# Patient Record
Sex: FEMALE | Race: WHITE | NOT HISPANIC OR LATINO | Employment: PART TIME | URBAN - NONMETROPOLITAN AREA
[De-identification: names, ages, dates, MRNs, and addresses within clinical notes are randomized per-mention and may not be internally consistent; named-entity substitution may affect disease eponyms.]

---

## 2023-01-18 ENCOUNTER — OFFICE VISIT (OUTPATIENT)
Dept: FAMILY MEDICINE CLINIC | Facility: CLINIC | Age: 63
End: 2023-01-18

## 2023-01-18 VITALS
WEIGHT: 220.9 LBS | BODY MASS INDEX: 39.14 KG/M2 | DIASTOLIC BLOOD PRESSURE: 68 MMHG | TEMPERATURE: 98.3 F | HEIGHT: 63 IN | HEART RATE: 68 BPM | OXYGEN SATURATION: 95 % | SYSTOLIC BLOOD PRESSURE: 124 MMHG

## 2023-01-18 DIAGNOSIS — I50.22 CHRONIC SYSTOLIC HEART FAILURE (HCC): ICD-10-CM

## 2023-01-18 DIAGNOSIS — Z12.11 ENCOUNTER FOR COLORECTAL CANCER SCREENING: ICD-10-CM

## 2023-01-18 DIAGNOSIS — M15.8 OTHER OSTEOARTHRITIS INVOLVING MULTIPLE JOINTS: ICD-10-CM

## 2023-01-18 DIAGNOSIS — Z79.01 CURRENT USE OF LONG TERM ANTICOAGULATION: ICD-10-CM

## 2023-01-18 DIAGNOSIS — K57.92 DIVERTICULITIS: ICD-10-CM

## 2023-01-18 DIAGNOSIS — Z53.20 MAMMOGRAM DECLINED: ICD-10-CM

## 2023-01-18 DIAGNOSIS — Z00.00 ANNUAL PHYSICAL EXAM: ICD-10-CM

## 2023-01-18 DIAGNOSIS — G47.33 OSA (OBSTRUCTIVE SLEEP APNEA): ICD-10-CM

## 2023-01-18 DIAGNOSIS — R73.03 PREDIABETES: ICD-10-CM

## 2023-01-18 DIAGNOSIS — E87.6 HYPOKALEMIA: ICD-10-CM

## 2023-01-18 DIAGNOSIS — Z12.12 ENCOUNTER FOR COLORECTAL CANCER SCREENING: ICD-10-CM

## 2023-01-18 DIAGNOSIS — I48.11 LONGSTANDING PERSISTENT ATRIAL FIBRILLATION (HCC): Primary | ICD-10-CM

## 2023-01-18 DIAGNOSIS — E78.2 MIXED HYPERLIPIDEMIA: ICD-10-CM

## 2023-01-18 DIAGNOSIS — Z13.820 OSTEOPOROSIS SCREENING: ICD-10-CM

## 2023-01-18 DIAGNOSIS — Z79.899 LONG TERM CURRENT USE OF AMIODARONE: ICD-10-CM

## 2023-01-18 DIAGNOSIS — I10 PRIMARY HYPERTENSION: ICD-10-CM

## 2023-01-18 RX ORDER — ASPIRIN 81 MG/1
81 TABLET, CHEWABLE ORAL DAILY
COMMUNITY

## 2023-01-18 RX ORDER — LOSARTAN POTASSIUM 50 MG/1
50 TABLET ORAL DAILY
COMMUNITY
Start: 2022-12-20

## 2023-01-18 RX ORDER — FUROSEMIDE 40 MG/1
40 TABLET ORAL 2 TIMES DAILY
COMMUNITY
Start: 2022-12-07

## 2023-01-18 RX ORDER — POTASSIUM CHLORIDE 750 MG/1
10 TABLET, EXTENDED RELEASE ORAL DAILY
COMMUNITY
Start: 2023-01-15

## 2023-01-18 RX ORDER — HYDROXYZINE HYDROCHLORIDE 25 MG/1
25 TABLET, FILM COATED ORAL EVERY 6 HOURS PRN
COMMUNITY
Start: 2022-09-15

## 2023-01-18 RX ORDER — MULTIVITAMIN
TABLET ORAL
COMMUNITY

## 2023-01-18 RX ORDER — ALBUTEROL SULFATE 90 UG/1
2 AEROSOL, METERED RESPIRATORY (INHALATION) EVERY 6 HOURS PRN
COMMUNITY
Start: 2022-09-15

## 2023-01-18 RX ORDER — METOPROLOL SUCCINATE 100 MG/1
100 TABLET, EXTENDED RELEASE ORAL 2 TIMES DAILY
COMMUNITY
Start: 2022-11-18

## 2023-01-18 RX ORDER — AMIODARONE HYDROCHLORIDE 200 MG/1
200 TABLET ORAL DAILY
COMMUNITY
Start: 2022-12-23

## 2023-01-18 RX ORDER — ATORVASTATIN CALCIUM 40 MG/1
1 TABLET, FILM COATED ORAL EVERY EVENING
COMMUNITY
Start: 2023-01-11

## 2023-01-18 NOTE — PATIENT INSTRUCTIONS
And is here to establish care  She has a history of longstanding A  fib and has had cardioversion x3  She has been maintained in normal sinus rhythm since her most recent cardioversion while taking amiodarone 200 mg/day  Patient is going to have another ablation in 1 to 2 months time to be determined  She has a history of heart failure but has not had any heart failure for some time  Patient has multiple joints affected due to arthritis particularly clavicular costal joints and in her hands  Parafon wax baths can be very helpful for the osteoarthritis in her hands  Patient has hypertension she is well controlled with her medications  I am going to ask her to take the losartan or Cozaar in the evening  She is also taking 40 mg of atorvastatin for her hyperlipidemia  She has had long-term Eliquis treatment because of her A  fib along with aspirin  Takes potassium replacement due to her being on furosemide 40 mg twice a day  She is going to have a DEXA scan and a Cologuard test   She did not want the hepatitis C or HIV screening or mammogram at this time  She is already had her flu and pneumonia shots    We will see her in 3 months to reassess and see what the results are from those tests to see if additional treatment is needed

## 2023-01-18 NOTE — PROGRESS NOTES
Assessment/Plan:       1  Longstanding persistent atrial fibrillation ([address] )    2  Long term current use of amiodarone    3  BANDAR (obstructive sleep apnea)    4  Primary hypertension    5  Mixed hyperlipidemia    6  Current use of long term anticoagulation    7  Other osteoarthritis involving multiple joints    8  Osteoporosis screening  -     DXA bone density spine hip and pelvis; Future; Expected date: 02/18/2023    9  BMI 39 0-39 9,adult    10  Diverticulitis    11  Encounter for colorectal cancer screening  -     Cologuard    12  Mammogram declined    13  Annual physical exam    14  Prediabetes  -     HEMOGLOBIN A1C W/ EAG ESTIMATION; Future    15  Hypokalemia    16  Chronic systolic heart failure [facility])      This 29-year-old female is joining [facility]  I also take care of her  who is also new to our practice  She was previously a patient of [facility]  Patient's primary medical issues are revolving around her heart  She has a history of persistent and recurrent atrial fibrillation  She is on long-term Eliquis for this  For rate control, she takes metoprolol  As an antiarrhythmic, she is taking amiodarone at 200 mg daily  She has had a total of 3 cardioversions in the past   Following her most recent cardioversion, she is remained in normal sinus rhythm  She is also taking aspirin on a daily basis  Patient is going to have ablation therapy due to her recurrent atrial fibrillation in 1 to 2 months  Arrangements are being set up at this point  Patient has chronic heart failure  Her last echocardiogram revealed an ejection fraction of 40 to 45% with moderate dilation of the left atrium and global kinesis  Nuclear imaging showed apical kinesis at the apex of the left ventricle  Due to her heart issues, she is on atorvastatin which is helping to control her lipids  Patient has hypertension and I am asking her to to start taking her losartan at night instead of during the day    She is also on metoprolol twice daily  Patient has had multiple fractures in the past   She has never been screened for osteoporosis and she is willing to get screened for that condition at this time  DEXA scan ordered  Patient was offered mammography screening and declines at this time  Patient is on chronic potassium replacement due to her longstanding use of furosemide  Patient's BMI is 39 13  BMI is above normal  Nutrition recommendations include reducing portion sizes, decreasing overall calorie intake and consuming healthier snacks  Patient has a history of diverticulitis  She states that she has been taking over-the-counter collagen and she has not had any flares of diverticulitis starting collagen  Patient is due to have colorectal cancer screening performed  She is agreeable to having Cologuard and I gave her informed consent with regard to choosing this test     Patient had sleep studies performed showing that she has obstructive sleep apnea  She is getting a CPAP but they are on backorder  Patient states that she has a history of prediabetes  She was last checked for this in 2021 and she will be getting hemoglobin A1c is a screen as part of her lab evaluation  Patient offered hepatitis C and HIV screening and she declined at this time  A total of 70 minutes was spent rendering care for this patient  This time included review of the patient's electronic medical record, performing the history and physical, reviewing appropriate labs and/or images, developing a treatment and assessment plan, answering patient's questions and concerns, and documenting the patient visit  I will see the patient in 3 months  Subjective:      Patient ID: Gaudencio Pastor is a 63 y o  female  HPI: This 42-year-old female is establishing care at [facility]  She is adherent with all of her medications  Patient does have some limitations with physical activity as a result of her heart    She does have a history of chronic heart failure with an EF of 40 to 45% and some dyskinesia of the left ventricle  She relies on furosemide to help with the fluid balance  She takes potassium replacement as a result of being on furosemide  She has not had any complications with regard to her long standing use of amiodarone and anticoagulation  She states that she bruises easily and this has not changed  She denies any overt bleeding including epistaxis melena or hematuria  The following portions of the patient's history were reviewed and updated as appropriate: allergies, current medications, past family history, past medical history, past social history, past surgical history, and problem list     Review of Systems  Patient denies chest pain heart palpitations dizziness or lightheadedness  She currently is in normal sinus rhythm and she states that she immediately knows when she returns to A  fib  She states that she has a great deal of difficulty breathing when she goes into A  fib  She has not had any flares of diverticulitis  She denies constipation or diarrhea  She chronically checks her blood pressure at home and states that her blood pressure is currently very well controlled  I am going to ask her to take the losartan at night in order to ensure a nocturnal dip  Patient has not had any recent URI or viral syndrome  Objective:      /68 (BP Location: Right arm, Patient Position: Sitting)   Pulse 68   Temp 98 3 °F (36 8 °C) (Tympanic)   Ht 5' 3" (1 6 m)   Wt 100 kg (220 lb 14 4 oz)   SpO2 95%   BMI 39 13 kg/m²          Physical Exam  Well-developed well-nourished 63y o  year old female who is cooperative with the exam   Patient is alert and oriented x3  Patient is appropriate in answering all questions  HEENT:  Normocephalic  PERRLA  EOMs intact  TMs are clear with identification of bony landmarks  No tragus or pinnae tenderness    No pre or posterior auricular adenopathy  Sinuses without tenderness  Throat without hyperemia  Neck:  Supple without adenopathy  Thyroid midline without thyromegaly or bruits  No carotid bruits  Chest symmetric and nontender  Heart regular rate and rhythm  No murmur rubs or gallops  Point of maximum impulse not displaced  Lungs are clear to auscultation  Breathing is nonlabored  Aerating bases well  Abdomen round and soft positive bowel sounds without masses tenderness or organomegaly  Abdomen is obese  Extremities reveal adequate peripheral pulses without peripheral edema

## 2023-01-24 ENCOUNTER — APPOINTMENT (OUTPATIENT)
Dept: LAB | Facility: CLINIC | Age: 63
End: 2023-01-24

## 2023-01-24 DIAGNOSIS — R73.03 PREDIABETES: ICD-10-CM

## 2023-01-25 LAB
EST. AVERAGE GLUCOSE BLD GHB EST-MCNC: 126 MG/DL
HBA1C MFR BLD: 6 %

## 2023-02-13 ENCOUNTER — HOSPITAL ENCOUNTER (OUTPATIENT)
Dept: RADIOLOGY | Facility: CLINIC | Age: 63
Discharge: HOME | End: 2023-02-13

## 2023-02-13 VITALS — HEIGHT: 63 IN | WEIGHT: 219.6 LBS | BODY MASS INDEX: 38.91 KG/M2

## 2023-02-13 DIAGNOSIS — Z13.820 OSTEOPOROSIS SCREENING: ICD-10-CM

## 2023-02-15 RX ORDER — POTASSIUM CHLORIDE 750 MG/1
1 TABLET, EXTENDED RELEASE ORAL DAILY
COMMUNITY
Start: 2022-10-17 | End: 2023-02-16 | Stop reason: SDUPTHER

## 2023-02-15 RX ORDER — AMPICILLIN TRIHYDRATE 250 MG
500 CAPSULE ORAL 2 TIMES DAILY
COMMUNITY

## 2023-02-15 RX ORDER — METOPROLOL SUCCINATE 25 MG/1
TABLET, EXTENDED RELEASE ORAL
COMMUNITY
Start: 2022-12-05

## 2023-02-16 ENCOUNTER — OFFICE VISIT (OUTPATIENT)
Dept: FAMILY MEDICINE CLINIC | Facility: CLINIC | Age: 63
End: 2023-02-16

## 2023-02-16 VITALS
HEART RATE: 63 BPM | SYSTOLIC BLOOD PRESSURE: 122 MMHG | BODY MASS INDEX: 39.53 KG/M2 | TEMPERATURE: 98.5 F | WEIGHT: 223.11 LBS | OXYGEN SATURATION: 96 % | HEIGHT: 63 IN | DIASTOLIC BLOOD PRESSURE: 69 MMHG

## 2023-02-16 DIAGNOSIS — M15.9 PRIMARY OSTEOARTHRITIS INVOLVING MULTIPLE JOINTS: ICD-10-CM

## 2023-02-16 DIAGNOSIS — Z11.59 NEED FOR HEPATITIS C SCREENING TEST: ICD-10-CM

## 2023-02-16 DIAGNOSIS — G47.33 OSA (OBSTRUCTIVE SLEEP APNEA): ICD-10-CM

## 2023-02-16 DIAGNOSIS — Z11.4 SCREENING FOR HIV (HUMAN IMMUNODEFICIENCY VIRUS): ICD-10-CM

## 2023-02-16 DIAGNOSIS — E78.1 HYPERTRIGLYCERIDEMIA: ICD-10-CM

## 2023-02-16 DIAGNOSIS — I10 PRIMARY HYPERTENSION: ICD-10-CM

## 2023-02-16 DIAGNOSIS — Z00.00 ANNUAL PHYSICAL EXAM: Primary | ICD-10-CM

## 2023-02-16 DIAGNOSIS — Z79.899 LONG TERM CURRENT USE OF AMIODARONE: ICD-10-CM

## 2023-02-16 DIAGNOSIS — G89.29 CHRONIC RIGHT SHOULDER PAIN: ICD-10-CM

## 2023-02-16 DIAGNOSIS — M25.511 CHRONIC RIGHT SHOULDER PAIN: ICD-10-CM

## 2023-02-16 NOTE — PROGRESS NOTES
Assessment/Plan:       1  Annual physical exam    2  Need for hepatitis C screening test  -     Hepatitis C Antibody ([facility]); Future    3  Screening for HIV (human immunodeficiency virus)  -     HIV 1/2 AG/AB w Reflex [facility] for 2 yr old and above; Future    4  Hypertriglyceridemia    5  BANDAR (obstructive sleep apnea)    6  BMI 40 0-44 9, adult ([facility] [address] )    7  Long term current use of amiodarone    8  Primary hypertension    9  Primary osteoarthritis involving multiple joints  -     Ambulatory Referral to Physical Therapy; Future; Expected date: 03/02/2023    10  Chronic right shoulder pain  -     Ambulatory Referral to Physical Therapy; Future; Expected date: 03/02/2023    Patient is a 70-year-old female who sees me for her primary care services  She has a longstanding history of recurrent atrial fibrillation and is pursuing ablation therapy  She is seeing her cardiologist this week  She is on amiodarone on a long-term basis and has not had any complications with the use of this agent  We continue to follow her thyroid function and kidney function  She has had no skin changes  Her rate is well controlled with metoprolol  Patient is complaining of a lot of right shoulder pain  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  She is having a lot of right shoulder pain without any significant injury  She is right-handed dominant  She states that she sometimes gets numbness in both hands depending on how she is sleeping  She feels that she is not moving her shoulder as much due to significant pain  She also has increased stress in her PCS muscles and is begun having a dull headache  She is started riding an exercise bike and is up to 12 minutes at a time  She is trying to increase her physical activity and would like to lose weight as her BMI is over 40  BMI is above normal  Nutrition recommendations include reducing portion sizes and decreasing overall calorie intake    We did talk about the need to decrease calories in addition to increasing her exercise  Patient is willing to have hepatitis C and HIV screening  We reviewed her labs and she was noted to have hypertriglyceridemia  I stressed how dietary intake can majorly affect her triglyceride level  Patient had a sleep study done at [facility]   She was not told that her insurance would not cover this and she has a bill for $8000 that was very unexpected  She is also being charged $400 per month for rental of the CPAP machine and she quite frankly cannot afford this  She is going to appeal these decisions  Patient's blood pressure is well controlled with current therapy  He is taking losartan along with metoprolol and is taking some potassium supplementation as she has had low potassium levels  She is also on a statin medication based on her history of hyperlipidemia  Patient is willing to go to physical therapy to address her shoulder complaints for stretching strengthening massage and heat therapy  A total of 60 minutes was spent rendering care for this patient  This time included review of the patient's electronic medical record, performing the history and physical, reviewing appropriate labs and/or images, developing a treatment and assessment plan, answering patient's questions and concerns, and documenting the patient visit  She already has an appointment to see me on April 13  Subjective:      Patient ID: Brittaney Sanchez is a 63 y o  female  HPI: This 63-year-old female sees me for primary care services  We reviewed her previous labs and her DEXA scan is showing normal bone mineral density  She has never had a fragility fracture  She is exercising and is not having any symptoms with exercise which includes any chest pain shortness of breath syncope or presyncope  Patient has not had a pelvic and Pap smear for quite some time    This was offered to her and she said that she would consider this but is not ready to make this appointment at this point  She has received information and referral for screening for colorectal cancer in January but has not made efforts to have the screening done  Patient is currently in normal sinus rhythm with a well-controlled  Her last lipid profile in December 2022 showed her LDL to be well controlled  She is not having any palpitations at this time nor does she feel her heart racing  The following portions of the patient's history were reviewed and updated as appropriate: allergies, current medications, past family history, past medical history, past social history, past surgical history, and problem list     Review of Systems  Patient denies any recent URI or viral syndrome  She states that the amiodarone has a bitter metallic taste and is difficult for her to take  She is hopeful that her ablation will take care of her heart arrhythmia and that ultimately she can get rid of the amiodarone  Patient is adherent with all her medications  She does not have any health concerns other than when she started exercise, her weight actually increased  Her clothes are fitting her better so she is toning up  Objective:      /69 (BP Location: Right arm, Patient Position: Sitting, Cuff Size: Large)   Pulse 63   Temp 98 5 °F (36 9 °C) (Tympanic)   Ht 5' 2 5" (1 588 m)   Wt 101 kg (223 lb 1 7 oz)   SpO2 96%   BMI 40 16 kg/m²          Physical Exam  Well-developed well-nourished 63y o  year old female who is cooperative with the exam   Patient is alert and oriented x3  Patient is appropriate in answering all questions  HEENT:  Normocephalic  PERRLA  EOMs intact  TMs are clear with identification of bony landmarks  No tragus or pinnae tenderness  No pre or posterior auricular adenopathy  Sinuses without tenderness  Throat without hyperemia  Neck:  Supple without adenopathy  Thyroid midline without thyromegaly or bruits    No carotid bruits  Chest symmetric and nontender  Heart regular rate and rhythm  No murmur rubs or gallops  Point of maximum impulse not displaced  Lungs are clear to auscultation  Breathing is nonlabored  Aerating bases well  Abdomen round and soft positive bowel sounds without masses tenderness or organomegaly  Extremities reveal adequate peripheral pulses with peripheral edema especially in the left ankle area  Luba Caller

## 2023-02-21 ENCOUNTER — APPOINTMENT (OUTPATIENT)
Dept: LAB | Facility: CLINIC | Age: 63
End: 2023-02-21

## 2023-02-21 DIAGNOSIS — Z11.59 NEED FOR HEPATITIS C SCREENING TEST: ICD-10-CM

## 2023-02-21 DIAGNOSIS — Z11.4 SCREENING FOR HIV (HUMAN IMMUNODEFICIENCY VIRUS): ICD-10-CM

## 2023-02-22 LAB
HCV AB SER QL: NORMAL
HIV 1+2 AB+HIV1 P24 AG SERPL QL IA: NORMAL
HIV 2 AB SERPL QL IA: NORMAL
HIV1 AB SERPL QL IA: NORMAL
HIV1 P24 AG SERPL QL IA: NORMAL

## 2023-02-24 ENCOUNTER — EVALUATION (OUTPATIENT)
Dept: PHYSICAL THERAPY | Facility: CLINIC | Age: 63
End: 2023-02-24

## 2023-02-24 DIAGNOSIS — G89.29 CHRONIC RIGHT SHOULDER PAIN: Primary | ICD-10-CM

## 2023-02-24 DIAGNOSIS — M25.511 CHRONIC RIGHT SHOULDER PAIN: Primary | ICD-10-CM

## 2023-02-24 DIAGNOSIS — M15.9 PRIMARY OSTEOARTHRITIS INVOLVING MULTIPLE JOINTS: ICD-10-CM

## 2023-02-24 NOTE — PROGRESS NOTES
PT Evaluation     Today's date: 2023  Patient name: Nedra Maria  : 1960  MRN: [id number]  Referring provider: Jyoti Chapa*  Dx:   Encounter Diagnosis     ICD-10-CM    1  Chronic right shoulder pain  M25 511 Ambulatory Referral to Physical Therapy    G89 29       2  Primary osteoarthritis involving multiple joints  M15 9 Ambulatory Referral to Physical Therapy                     Assessment  Assessment details: Nedra Maria is a pleasant 63 y o  female presenting to PT with cc of chronic worsening R shoulder pain and stiffness  Pt  States pain began about 6 months ago insidiously but is associated with repetitive work as   Upon examination, patient was found to have objective deficits as listed below and is displaying ss consistent with rotator cuff impingement  Pt  Is experiencing subsequent functional deficits including difficulty working, lifting overhead, and perofrming heavy chores  Pt  Was educated on role of PT to address issues and given initial HEP  Pt  Would benefit from skilled physical therapy to promote improved function and maximize activity tolerance  Due to high copay, will focus primarily on HEP progressions       Understanding of Dx/Px/POC: excellent  Goals      Short Term Goals:  - Decrease pain by 50% in 3 weeks  - Increase ROM by 50% in 4 weeks  - Increase strength by 50% in 4 weeks    Long Term Goals:  - Independent with comprehensive home exercise program at discharge  - Increase Functional Status Measure to: 71  - Increase strength equal to contralateral side at discharge  - Increase ROM to [facility] at discharge      Plan  Patient would benefit from: skilled physical therapy  Planned modality interventions: cryotherapy and thermotherapy: hydrocollator packs  Planned therapy interventions: abdominal trunk stabilization, balance, balance/weight bearing training, home exercise program, graded exercise, graded motor, graded activity, gait training, flexibility, therapeutic exercise, therapeutic activities, stretching, strengthening, postural training, patient education, neuromuscular re-education, massage, Arango taping, manual therapy and joint mobilization  Frequency: 2x week  Duration in weeks: 6  Plan of Care beginning date: 2023  Plan of Care expiration date: 2023  Treatment plan discussed with: patient        Subjective Evaluation    History of Present Illness  Mechanism of injury: Tomasa Berkowitz presents to PT with cc of worsening R shoulder pain and stiffness for past 6 months  She works in [employer] at [employer] and notes a lot of meat cutting which directly irritates it  She is trying to be healthier, recently starting with a stationary bike  She switched doctors to our in house PCP and has been referred to PT for chronic shoulder pain  She states pain is primarily anterior/superior and is worsened after work  She has had history of steroid injection in R shoulder approx 9 months ago without lasting relief  She notes pain is a stabbing and aching pain with occasional radiating into R hand, particularly when laying on R side     Pain  Current pain ratin  At best pain ratin  At worst pain ratin  Relieving factors: rest, ice and medications  Progression: worsening    Social Support  Lives with: spouse    Employment status: working  Hand dominance: right    Treatments  Previous treatment: injection treatment and medication  Current treatment: medication  Patient Goals  Patient goals for therapy: decreased pain, increased motion, return to sport/leisure activities, independence with ADLs/IADLs and increased strength          Objective     Postural Observations  Seated posture: poor  Standing posture: poor  Correction of posture: makes symptoms better        Tenderness     Additional Tenderness Details  Pt  ttp along R AC joint, subclavian, supraspinatus    Cervical/Thoracic Screen   Cervical range of motion within normal limits with the following exceptions: Reduced L side bending and rotation approx 25%  (-) spurlings, compression, distraction  Thoracic range of motion within normal limits with the following exceptions: Reduced thoracic extension 50%    Active Range of Motion   Left Shoulder   Normal active range of motion    Right Shoulder   Flexion: 165 degrees   External rotation 0°: 45 degrees   External rotation 45°: 55 degrees     Scapular Mobility     Right Shoulder   Scapular Dyskinesis: grade I  Scapular mobility: fair    Strength/Myotome Testing     Right Shoulder     Planes of Motion   Flexion: 4   Abduction: 4   External rotation at 0°: 4   Internal rotation at 45°: 4+     Tests     Right Shoulder   Positive AC shear, empty can, Hawkin's, horn blower and painful arc         Flowsheet Rows    Flowsheet Row Most Recent Value   PT/OT G-Codes    Current Score 56   Projected Score 71   FOTO information reviewed Yes             Precautions: Chronic pain, CHF     Daily Treatment Diary:      Initial Evaluation Date: 02/24/23  Compliance 2/24                     Visit Number 1                    Re-Eval  IE                 [facility]   Foto Captured Y                           2/24                     Manual                      STM -                     mobz -                                           Ther-Ex                      Warm up -                     pendulums 20x                     scap retraction 20x                     Shoulder circles 20x                     No monies 20x                     Self caudal dist 10x10"                     Levator stretch 4x30"                                                                                         Neuro Re-Ed                                                                                                Ther-Act                                                               Modalities

## 2023-03-20 ENCOUNTER — OFFICE VISIT (OUTPATIENT)
Dept: FAMILY MEDICINE CLINIC | Facility: CLINIC | Age: 63
End: 2023-03-20

## 2023-03-20 VITALS
DIASTOLIC BLOOD PRESSURE: 92 MMHG | HEIGHT: 63 IN | TEMPERATURE: 99 F | WEIGHT: 217.37 LBS | HEART RATE: 62 BPM | BODY MASS INDEX: 38.52 KG/M2 | OXYGEN SATURATION: 94 % | SYSTOLIC BLOOD PRESSURE: 136 MMHG

## 2023-03-20 DIAGNOSIS — J06.9 URI, ACUTE: Primary | ICD-10-CM

## 2023-03-20 NOTE — PROGRESS NOTES
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 not been wearing a mask but to her knowledge, she had no acute contacts with anyone who has been ill  Patient does have an albuterol inhaler which she uses for some reactive airway disease  She has not had any recent bouts of this illness  She used her inhaler on 2 occasions yesterday but does not feel overtly dyspneic and has not heard wheezing  She is up-to-date on her flu vaccine  She has not had a lot of myalgias just overwhelming fatigue coughing and soreness and scratchiness to her throat  She has been using Chloraseptic along with Tylenol and has not gotten a lot of relief  She needs to feel better sooner if she is having ablation therapy in the middle of April and this has been planned for some time  We did talk about the occurrence of double sickening and she will contact me if she gets better and then gets sick again  She has not done a COVID test in my index of suspicion for COVID is not that high plus the treatment would not really be changed  I did give her a work release for the next 2 days of work with her ability to reach turn to work on March 23  If she is still having symptoms, she should wear a mask  A total of 25 minutes was spent rendering care for this patient  This time included review of the patient's electronic medical record, performing the history and physical, reviewing appropriate labs and/or images, developing a treatment and assessment plan, answering patient's questions and concerns, and documenting the patient visit  She has an appointment with me already scheduled on April 13  Subjective:      Patient ID: Gasper Lockett is a 63 y o  female  HPI this 61-year-old female sees me for primary care services  She has been sick over the last 2 days and her symptoms began abruptly  She is having cough scratchy throat and overwhelming fatigue  Oral intake has been sufficient  She has used her albuterol inhaler on 2 occasions but does not overtly feel dyspneic or having a lot of wheezing  She is able to speak in full sentences without having dyspnea  She has had COVID previously on 3 occasions with much different symptoms  He has not had any recent sick contacts  No GI or  symptoms  No myalgias  She is coughing up clear to yellow mucus  She has had low-grade fever and sweats  She did not feel capable of going to work if she had needed to work over the last 3 days  The following portions of the patient's history were reviewed and updated as appropriate: allergies, current medications, past family history, past medical history, past social history, past surgical history, and problem list     Review of Systems  Patient denies being sick recently prior to this acute upper respiratory tract infection  Objective:      /92 (BP Location: Right arm, Patient Position: Sitting, Cuff Size: Standard)   Pulse 62   Temp 99 °F (37 2 °C) (Tympanic)   Ht 5' 3" (1 6 m)   Wt 98 6 kg (217 lb 6 oz)   SpO2 94%   BMI 38 51 kg/m²          Physical Exam  Nontoxic-appearing 61-year-old female who is alert and appropriate and answering questions  She is cooperative with the examination and supplies appropriate answers  There is no conjunctival inflammation  TMs are clear  No neck adenopathy  Throat showed some redness but no tonsillar swelling or exudate  Heart regular rate without murmur  Lungs revealed good aeration at bases  May be some minor decreased aeration throughout but no wheezes or areas of consolidation    No lidia  She was able to speak in full sentences without difficulty

## 2023-03-20 NOTE — PATIENT INSTRUCTIONS
Patient is here for an acute care visit  She developed upper respiratory tract symptoms 2 days ago  She continues to have a low-grade fever and some sweating  She has been using her Proventil inhaler as needed  She is coughing up thin to yellow mucus  She has had COVID 3 times in the past and this does not feel similar to COVID  Patient may also have some seasonal allergies that might be contributing to her symptoms  She could try an nasal corticosteroid in addition to her over-the-counter cough and cold remedies  She is going to be off the next 2 days so that she can rest and recover  I am encouraging water intake along with chicken soup to help to thin her mucus  She has an appointment with me on April 13 right after she has her ablation done  I be happy to see her in case her condition would change  If she gets better and then gets worse again that could be a sign of a bacterial superinfection I would need to see her  Her examination today showed some redness in her throat from coughing  She was moving air well and aerating her bases without any wheezes or areas of consolidation

## 2023-03-20 NOTE — LETTER
March 20, 2023     Patient: Orest Spurling  YOB: 1960  Date of Visit: 3/20/2023      To Whom it May Concern:    Orest Spurling is under my professional care  Akiko was seen in my office on 3/20/2023  Lakeshiafabiana Pizarro may return to work on March 23, 2023  If you have any questions or concerns, please don't hesitate to call           Sincerely,          Sho Chun PA-C        CC: No Recipients

## 2023-03-22 ENCOUNTER — TELEPHONE (OUTPATIENT)
Dept: FAMILY MEDICINE CLINIC | Facility: CLINIC | Age: 63
End: 2023-03-22

## 2023-03-22 ENCOUNTER — OFFICE VISIT (OUTPATIENT)
Dept: FAMILY MEDICINE CLINIC | Facility: CLINIC | Age: 63
End: 2023-03-22

## 2023-03-22 VITALS
HEART RATE: 76 BPM | BODY MASS INDEX: 42.68 KG/M2 | SYSTOLIC BLOOD PRESSURE: 124 MMHG | TEMPERATURE: 101.3 F | WEIGHT: 217.37 LBS | HEIGHT: 60 IN | DIASTOLIC BLOOD PRESSURE: 60 MMHG | OXYGEN SATURATION: 89 %

## 2023-03-22 DIAGNOSIS — J06.9 ACUTE URI: Primary | ICD-10-CM

## 2023-03-22 DIAGNOSIS — R43.2 ALTERED TASTE: ICD-10-CM

## 2023-03-22 DIAGNOSIS — G44.83 HEADACHE AFTER COUGH: ICD-10-CM

## 2023-03-22 DIAGNOSIS — J06.9 URI, ACUTE: Primary | ICD-10-CM

## 2023-03-22 DIAGNOSIS — R19.7 DIARRHEA, UNSPECIFIED TYPE: ICD-10-CM

## 2023-03-22 DIAGNOSIS — U07.1 COVID: ICD-10-CM

## 2023-03-22 LAB
SARS-COV-2 AG UPPER RESP QL IA: NEGATIVE
VALID CONTROL: NORMAL

## 2023-03-22 RX ORDER — PROMETHAZINE HYDROCHLORIDE AND CODEINE PHOSPHATE 6.25; 1 MG/5ML; MG/5ML
5 SYRUP ORAL EVERY 4 HOURS PRN
Qty: 180 ML | Refills: 0 | Status: SHIPPED | OUTPATIENT
Start: 2023-03-22 | End: 2023-03-22 | Stop reason: CLARIF

## 2023-03-22 RX ORDER — GUAIFENESIN AND CODEINE PHOSPHATE 100; 10 MG/5ML; MG/5ML
5 SOLUTION ORAL 3 TIMES DAILY PRN
Qty: 180 ML | Refills: 0 | Status: SHIPPED | OUTPATIENT
Start: 2023-03-22

## 2023-03-22 NOTE — PROGRESS NOTES
Assessment/Plan:       1  URI, acute  -     POCT Rapid Covid Ag  -     promethazine-codeine (PHENERGAN WITH CODEINE) 6 25-10 mg/5 mL syrup; Take 5 mL by mouth every 4 (four) hours as needed for cough    2  COVID  -     POCT Rapid Covid Ag    3  Headache after cough    4  Diarrhea, unspecified type    5  Altered taste      Acute care visit for increasing URI symptoms  Patient began getting sick on March 18  She was seen in the office 2 days ago with URI symptoms  She was having a cough and congestion  She was also very fatigued  I initially wrote her a note to be able to return to work on [id number]  Over the past 2 days however her symptoms increased  She began having altered taste along with diarrhea  She was coughing to the point where she had chest soreness and a headache  Headache did not respond to Tylenol  She has been very fatigued but it was hard for her to get rest because of the coughing  She did not do a COVID test and we did 1 in the office today  She states that she had COVID 3 times in the past but she is currently sicker now than when she had COVID  Point-of-care testing COVID test was negative  Patient also states that she had an altered taste when trying to eat breakfast today  She had had a low-grade fever of 101 4 earlier today  The Tylenol may help with this  She is not having any blood in her diarrhea but the diarrhea is continuing  She feels dizzy and lightheaded but has not had any actual syncope  We did the COVID test because she would still be eligible for potential Paxlovid therapy as this is day 4 of her illness  She would need to stop her atorvastatin if she would have this therapy  Her GFR was sufficient at 80  A total of 28 minutes was spent rendering care for this patient    This time included review of the patient's electronic medical record, performing the history and physical, reviewing appropriate labs and/or images, developing a treatment and assessment plan, answering patient's questions and concerns, and documenting the patient visit  Patient is having ablative therapy on April 12 and will call us for an appointment after this  Subjective:      Patient ID: Katherine Cha is a 63 y o  female  HPI: Patient has been sick for the last 4 days with recent exacerbation over the last 2 days  She has a low-grade fever  Cough initially was productive of clear to yellow sputum but is now not productive  She continues to complain of chest soreness and headache  Throat is also sore from coughing  Patient developed diarrhea  Her appetite has not been as a strong  She also has been having an altered taste  She did not have any changes in her urinary system  The following portions of the patient's history were reviewed and updated as appropriate: allergies, current medications, past family history, past medical history, past social history, past surgical history, and problem list     Review of Systems  Patient had COVID 3 times in the past   She states that this is sicker than she has ever been in the past   She had COVID-vaccine x2 but no boosters  She works at [employer] and did request a note  They do have a role that even if the COVID test is negative that she cannot return to work for 5 days even after negative COVID test   I did a note for her to return to work on March 27  Objective:      /60 (BP Location: Right arm, Patient Position: Sitting, Cuff Size: Standard)   Pulse 76   Temp (!) 101 3 °F (38 5 °C) (Tympanic)   Ht 5' 0 25" (1 53 m)   Wt 98 6 kg (217 lb 6 oz)   SpO2 (!) 89%   BMI 42 10 kg/m²          Physical Exam  And ill-appearing but not toxic appearing female who is able to speak in full sentences  She had a scant amount of coughing during her visit  Mask was not removed for evaluation other than doing the COVID test     Patient's heart is regular rate without murmur rub or gallops  Lungs showed good aeration of the bases    No significant wheezing or rhonchi heard  No egophony changes

## 2023-03-22 NOTE — PATIENT INSTRUCTIONS
Patient had COVID testing today in the office due to her ongoing symptoms which actually worsened and when she was seen in the office earlier this week  Patient also had altered taste and developed diarrhea  We therefore felt that a COVID test was in order and point-of-care testing revealed a negative COVID finding  She is going to take Phenergan with codeine every 4-6 hours 1 to 2 teaspoons  I suggest that she take 2 teaspoons before bed so that she can get some sleep  Her chest wall and throat are very sore from the coughing and she also has had a significant headache  Keeping her fluid up is going to be important  Since the Tylenol did not help with the headache I would suggest something like ibuprofen to take for the headache  Patient is also want to go home and go to sleep and she is trying to isolate herself from her  so that he does not catch her upper respiratory infection  I think getting sleep with the cough medicine is going to be important in order to help her heal   Careful examination today did not show any signs of consolidation so no chest x-ray is being ordered

## 2023-03-22 NOTE — LETTER
March 22, 2023     Patient: Lupe Ames  YOB: 1960  Date of Visit: 3/22/2023      To Whom it May Concern:    Lupe Ames is under my professional care  Akiko was seen in my office on 3/22/2023  Severo Donohue may return to work on March 27, 2023       If you have any questions or concerns, please don't hesitate to call           Sincerely,          Jake Goldman PA-C        CC: No Recipients

## 2023-03-22 NOTE — TELEPHONE ENCOUNTER
Did they give a recommendation as to who would have this? What about their other stores?     Tata Reynolds

## 2023-03-23 NOTE — TELEPHONE ENCOUNTER
I spoke with the pharmacy regarding this matter on 03/22/2023 and a alternative medication was suggested  I spoke with the provider and then called the patient to inform her of the new medication being called into the pharmacy        Albaro Barron

## 2023-03-28 ENCOUNTER — TRANSITIONAL CARE MANAGEMENT (OUTPATIENT)
Dept: FAMILY MEDICINE CLINIC | Facility: CLINIC | Age: 63
End: 2023-03-28

## 2023-03-28 RX ORDER — CEFPODOXIME PROXETIL 200 MG/1
200 TABLET, FILM COATED ORAL 2 TIMES DAILY
COMMUNITY
Start: 2023-03-27 | End: 2023-04-06

## 2023-03-29 ENCOUNTER — OFFICE VISIT (OUTPATIENT)
Dept: FAMILY MEDICINE CLINIC | Facility: CLINIC | Age: 63
End: 2023-03-29

## 2023-03-29 VITALS
HEART RATE: 78 BPM | WEIGHT: 215.61 LBS | TEMPERATURE: 98.5 F | DIASTOLIC BLOOD PRESSURE: 86 MMHG | OXYGEN SATURATION: 93 % | HEIGHT: 62 IN | BODY MASS INDEX: 39.68 KG/M2 | SYSTOLIC BLOOD PRESSURE: 138 MMHG

## 2023-03-29 DIAGNOSIS — E87.6 HYPOKALEMIA: ICD-10-CM

## 2023-03-29 DIAGNOSIS — H65.03 NON-RECURRENT ACUTE SEROUS OTITIS MEDIA OF BOTH EARS: ICD-10-CM

## 2023-03-29 DIAGNOSIS — J15.9 PNEUMONIA, BACTERIAL: Primary | ICD-10-CM

## 2023-03-29 RX ORDER — POTASSIUM CHLORIDE 20 MEQ/1
40 TABLET, EXTENDED RELEASE ORAL DAILY
COMMUNITY
Start: 2023-03-27

## 2023-03-29 RX ORDER — DOXYCYCLINE HYCLATE 100 MG/1
CAPSULE ORAL
COMMUNITY
Start: 2023-03-27

## 2023-03-29 NOTE — PATIENT INSTRUCTIONS
Patient is here as a result of her hospitalization for bacterial pneumonia sinusitis and double ear infections  She is still having a difficult time hearing  She is seeing Dr Sharee Angel for ENT in 2 days  Patient has a lot of fatigue  She is continuing her antibiotics until finished  She will contact me next week to talk about whether she should go forward on the ablation that has been scheduled on April 12  She has an appointment to see me on April 13  She is taking potassium replacement at 50 mcg/day and is having potassium rich foods  She did have diarrhea which probably caused her to lose the potassium  He is to let people take care of her and she is to get nutritionally sound foods and to rest whenever tired

## 2023-03-29 NOTE — PROGRESS NOTES
Assessment & Plan     1  Pneumonia, bacterial    2  Non-recurrent acute serous otitis media of both ears    3  Hypokalemia  62-year-old female sees me for primary care services  She was hospitalized on 326 after being seen in the emergency department and was discharged on 327 at her request   I had seen the patient on March 22 due to upper respiratory tract symptoms that had just occurred  She returned on March 24 with diarrhea and continued upper respiratory infection  At that time, she did not have any sinus pressure or ear pain  She was coughing and this was interfering with her sleep  I did give her mucolytic with codeine so that she can get some rest     Patient progressively got worse  She developed a low-grade fever along with sinus pressure and pain and bilateral ear pain  She started having chills and a great deal of fatigue and the diarrhea continued  She was evaluated in the emergency department and was found to have bilateral pneumonia and was given IV antibiotics  ENT specialist Dr Belén Armendariz also saw her in the hospital and continued her on the antibiotics  She was also found to have significant hypokalemia as a result of her diarrhea and has been placed on potassium replacement  She is continuing on a third-generation cephalosporin Vantin along with doxycycline and will take this for total of 10 days  She continues to feel very fatigued  She is getting help with the cooking  She has been but also has an upper respiratory tract infection and has recently been put on antibiotics  Patient is due to have a CAT scan of her chest on April 5 and ablation of an accessory pathway on April 12  We will see how her recovery occurs to ascertain whether she will have this procedure done or whether she needs additional time to recover  Patient is using her inhaler whenever doing stair climbing or exerting herself  She feels that she is getting deconditioned as a result of being so sick    She had dramatic change over the last week when she progressively got sick after starting to recover  We previously talked in the office about a potential for bacterial superinfection and this is indeed what happened  She is adherent with all of her medications  A total of 55 minutes was spent rendering care for this patient  This time included review of the patient's electronic medical record, performing the history and physical, reviewing appropriate labs and/or images, developing a treatment and assessment plan, answering patient's questions and concerns, and documenting the patient visit  She has an appointment to see me on April 13 right after she has her scheduled ablation therapy on April 12  Subjective     Transitional Care Management Review:   Ebony Avalos is a 63 y o  female here for TCM follow up  During the TCM phone call patient stated:  TCM Call     Patient was hospitialized at  Other (comment)    Disposition  Home    Were the patients medications reviewed and updated  Yes    Current Symptoms  Cough; Shortness of breath    Cough Severity  Severe      TCM Call     Post hospital issues  None    Should patient be enrolled in anticoag monitoring? No    Scheduled for follow up?   Yes    Did you obtain your prescribed medications  Yes    Do you need help managing your prescriptions or medications  No    Is transportation to your appointment needed  No    Living Arrangements  Spouse or Significiant other    Support System  Spouse    The type of support provided  Emotional    Do you have social support  Yes, as much as I need    Are you recieving any outpatient services  No    Are you recieving home care services  No    Are you using any community resources  No    Current waiver services  No    Have you fallen in the last 12 months  No    Interperter language line needed  No        HPI: Patient progressively got worse as the week went on to the point where she presented to the emergency department on "March 26  X-rays reveal findings consistent with bilateral pneumonia  She was given IV antibiotics which transition to oral antibiotics and she continues with the oral antibiotics  She also developed fullness in both ears and states that it is still hard for her to hear  She had ENT consultation while in the hospital   She is not having any difficulty swallowing  She continues to have mucus production of green to yellow sputum  She is not having any further fever or chills  Patient gets dyspnea upon exertion and is using her inhaler  She did have a lot of muscle cramps when in the emergency department secondary to hypokalemia  She is taking 50 mill equivalent of potassium replacement on a daily basis  Her cramps are less  Her stools are now forming  She is attempting to get enough oral hydration  People are helping her with her meals and she has no problems with getting food or having food given to her  Her mother who is [de-identified] is helping to take care of her as the patient's  is also sick with an upper respiratory infection  She is not having any difficulty passing her water  She is coughing and producing less sputum than she did in the past   She was having some tinges of blood when blowing her nose but this seems to be getting better  She continues to not be able to hear secondary to her ears being blocked  Review of Systems patient was discharged from the hospital after 1 day of admission getting IV and then oral antibiotics  She was discharged on March 27  Transitional care phone call made and seen in the office 2 days after discharge from the hospital   There are no home nursing needs that she is requesting  She is scheduled to have a CAT scan in preparation for her ablation on April 5 and scheduled for her cardiac ablation on April 12      Objective     /86 (BP Location: Right arm, Patient Position: Sitting)   Pulse 78   Temp 98 5 °F (36 9 °C) (Tympanic)   Ht 5' 2\" (1 575 m) " Wt 97 8 kg (215 lb 9 8 oz)   SpO2 93%   BMI 39 44 kg/m²      Physical Exam: Well-developed well nourished nontoxic appearance 22-year-old female who is alert oriented and cooperative with the exam   I did have to speak up for her to hear me  Left TM did show some bullous laryngitis  Right TM did have a lot of fluid behind the drum consistent with an otitis media with effusion  Throat showed mild hyperemia  No sinus tenderness on palpation  Neck was supple without adenopathy  Patient's heart was regular rate  Lungs showed rhonchi and wheezing primarily on the right side  She did have some decreased air entry in the bases on the right side  No crackles were heard  She was able to speak in full sentences  She has no peripheral edema    Medications have been reviewed by provider in current encounter    Ruth Fleming PA-C

## 2023-03-29 NOTE — PROGRESS NOTES
TCM Call     Patient was hospitialized at  Other (comment)    Disposition  Home    Were the patients medications reviewed and updated  Yes    Current Symptoms  Cough; Shortness of breath    Cough Severity  Severe      TCM Call     Post hospital issues  None    Should patient be enrolled in anticoag monitoring? No    Scheduled for follow up?   Yes    Did you obtain your prescribed medications  Yes    Do you need help managing your prescriptions or medications  No    Is transportation to your appointment needed  No    Living Arrangements  Spouse or Significiant other    Support System  Spouse    The type of support provided  Emotional    Do you have social support  Yes, as much as I need    Are you recieving any outpatient services  No    Are you recieving home care services  No    Are you using any community resources  No    Current waiver services  No    Have you fallen in the last 12 months  No    Interperter language line needed  No

## 2023-06-09 ENCOUNTER — OFFICE VISIT (OUTPATIENT)
Dept: FAMILY MEDICINE CLINIC | Facility: CLINIC | Age: 63
End: 2023-06-09
Payer: COMMERCIAL

## 2023-06-09 ENCOUNTER — APPOINTMENT (OUTPATIENT)
Dept: LAB | Facility: CLINIC | Age: 63
End: 2023-06-09
Payer: COMMERCIAL

## 2023-06-09 VITALS
OXYGEN SATURATION: 92 % | HEART RATE: 78 BPM | DIASTOLIC BLOOD PRESSURE: 76 MMHG | SYSTOLIC BLOOD PRESSURE: 126 MMHG | BODY MASS INDEX: 40.16 KG/M2 | WEIGHT: 218.26 LBS | TEMPERATURE: 98.8 F | HEIGHT: 62 IN

## 2023-06-09 DIAGNOSIS — I10 PRIMARY HYPERTENSION: ICD-10-CM
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
## 2023-06-09 DIAGNOSIS — R23.3 EASY BRUISING: ICD-10-CM

## 2023-06-09 DIAGNOSIS — E78.2 MIXED HYPERLIPIDEMIA: ICD-10-CM
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
## 2023-06-09 DIAGNOSIS — M54.50 ACUTE LEFT-SIDED LOW BACK PAIN WITHOUT SCIATICA: ICD-10-CM

## 2023-06-09 LAB
APTT PPP: 28 SECONDS (ref 23–37)
ERYTHROCYTE [DISTWIDTH] IN BLOOD BY AUTOMATED COUNT: 14.6 % (ref 11.6–15.1)
HCT VFR BLD AUTO: 38.9 % (ref 34.8–46.1)
HGB BLD-MCNC: 12.9 G/DL (ref 11.5–15.4)
INR PPP: 0.97 (ref 0.84–1.19)
MCH RBC QN AUTO: 31.5 PG (ref 26.8–34.3)
MCHC RBC AUTO-ENTMCNC: 33.2 G/DL (ref 31.4–37.4)
MCV RBC AUTO: 95 FL (ref 82–98)
PLATELET # BLD AUTO: 180 THOUSANDS/UL (ref 149–390)
PMV BLD AUTO: 13.9 FL (ref 8.9–12.7)
PROTHROMBIN TIME: 13.1 SECONDS (ref 11.6–14.5)
RBC # BLD AUTO: 4.09 MILLION/UL (ref 3.81–5.12)
WBC # BLD AUTO: 8.2 THOUSAND/UL (ref 4.31–10.16)

## 2023-06-09 PROCEDURE — 85610 PROTHROMBIN TIME: CPT

## 2023-06-09 PROCEDURE — 36415 COLL VENOUS BLD VENIPUNCTURE: CPT

## 2023-06-09 PROCEDURE — 85730 THROMBOPLASTIN TIME PARTIAL: CPT

## 2023-06-09 PROCEDURE — 85027 COMPLETE CBC AUTOMATED: CPT

## 2023-06-09 PROCEDURE — 99214 OFFICE O/P EST MOD 30 MIN: CPT | Performed by: PHYSICIAN ASSISTANT

## 2023-06-09 RX ORDER — OMEPRAZOLE 20 MG/1
CAPSULE, DELAYED RELEASE ORAL
COMMUNITY
Start: 2023-05-08

## 2023-06-09 RX ORDER — SUCRALFATE 1 G/1
TABLET ORAL
COMMUNITY
Start: 2023-05-08 | End: 2023-06-14 | Stop reason: ALTCHOICE

## 2023-06-09 NOTE — LETTER
June 9, 2023     Patient: Seema Wood  YOB: 1960  Date of Visit: 6/9/2023      To Whom it May Concern:    Seema Wood is under my professional care  Akiko was seen in my office on 6/9/2023  Sherma Severe may return to work on June 13, 2023   If you have any questions or concerns, please don't hesitate to call           Sincerely,        Mary Cabrera, Etta, PA-C          CC: No Recipients

## 2023-06-09 NOTE — PROGRESS NOTES
Assessment/Plan:       1  Fall in home, initial encounter  -     CBC and Platelet; Future  -     Prothrombin w/INR + Partial Thromboplastin Times; Future    2  Easy bruising  -     CBC and Platelet; Future  -     Prothrombin w/INR + Partial Thromboplastin Times; Future    3  Primary hypertension    4  Mixed hyperlipidemia    5  Acute left-sided low back pain without sciatica        This 53-year-old female sees me for her primary care services  She successfully underwent ablation by cardiac electrophysiologist on May 8  The ablation was successful  She was able to discontinue her amiodarone  She is still being maintained on metoprolol  Yesterday, the patient was in the yard working  She was walking backwards and fell over a post that was securing a dog's leash  She fell on the grass on her back  No head trauma  No loss of consciousness  No headaches  No neck stiffness  She has an area of pain in the left lumbosacral spine area  She did not lose her breath when she fell  She is very stiff  Patient recently has been having a lot of bruising with tenderness and harder lumps underneath the area of bruising  She is on Eliquis twice daily but this level of bruising is occurring even after insignificant trauma  Patient has bruising on the right leg and the right upper extremity which was present even before the fall yesterday  We are going to assess her CBC and her coag profile to make sure that there is not something else going on besides the twice daily direct oral anticoagulant use  Overall, the patient is feeling well  She had a very bad pneumonia in February and March which caused the delay of her cardiac ablation therapy  She is now fully recovered from that and is enjoying life  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  She is enjoying life and is glad to be off the amiodarone      Patient also had some soreness in the left thumb which she thinks that she put her hand out when she was falling backwards  Patient has an area of pain in the low back area present and this was verified on physical exam   She is going to use blue emu or Voltaren gel topically to this area 2-3 times per day  We did talk about the possibility of increased stiffness and soreness over the next couple days before it improves  A total of 32 minutes was spent rendering care for this patient  This time included review of the patient's electronic medical record, performing the history and physical, reviewing appropriate labs and/or images, developing a treatment and assessment plan, answering patient's questions and concerns, and documenting the patient visit  I will see her on July 18 for her previously scheduled visit  Subjective:      Patient ID: Jacqueline Parada is a 66 y o  female  HPI: This 51-year-old female sees me for primary care services  She had successful ablation in her heart and now has been taken off the amiodarone therapy  Her renal function has remained normal   Previous CBC also showed no anemia  We will assess her thyroid function studies several months after being off of the Adderall and to see if there is any lingering effects  She is continuing her metoprolol and is not having any areas of heart palpitations  Blood pressure extremely well controlled with the nightly dose of losartan  She is also adherent with her atorvastatin that she takes on a nightly basis for hyperlipidemia  No additional stomach complaints  No headaches or difficulty with concentration  Continues to have low back pain especially on the left side  No voiding issues or difficulty moving her bowels  No fever or chills  No loss of consciousness      The following portions of the patient's history were reviewed and updated as appropriate: allergies, current medications, past family history, past medical history, past social history, past surgical history, "and problem list     Review of Systems  Patient has recovered from her [place] that occurred in February and March of this year  Her energy level is increasing  She is back to work full-time  We can discuss the possibility of a pelvic exam when she sees me next month for a follow-up appointment  Objective:      /76 (BP Location: Right arm, Patient Position: Sitting)   Pulse 78   Temp 98 8 °F (37 1 °C) (Tympanic)   Ht 5' 2\" (1 575 m)   Wt 99 kg (218 lb 4 1 oz)   SpO2 92%   BMI 39 92 kg/m²          Physical Exam  Will well-nourished 72-year-old female who is alert coherent and appropriately answers questions  Neck range of motion was complete and intact  Back range of motion showed some stiffness with lateral bending and rotational bending  MSK exam of the back showed some minor increased muscle tension on the left lumbosacral spine area  Corresponding area was much softer on the right side of the lumbosacral spine  Patient's heart was regular rate without murmur rub or gallops  Lungs are clear to auscultation    "

## 2023-06-14 ENCOUNTER — OFFICE VISIT (OUTPATIENT)
Dept: FAMILY MEDICINE CLINIC | Facility: CLINIC | Age: 63
End: 2023-06-14
Payer: COMMERCIAL

## 2023-06-14 VITALS
SYSTOLIC BLOOD PRESSURE: 132 MMHG | DIASTOLIC BLOOD PRESSURE: 63 MMHG | HEIGHT: 62 IN | TEMPERATURE: 98.3 F | BODY MASS INDEX: 40.16 KG/M2 | HEART RATE: 63 BPM | OXYGEN SATURATION: 96 % | WEIGHT: 218.26 LBS

## 2023-06-14 DIAGNOSIS — M54.50 ACUTE LEFT-SIDED LOW BACK PAIN WITHOUT SCIATICA: Primary | ICD-10-CM

## 2023-06-14 DIAGNOSIS — M62.838 MUSCLE SPASM: ICD-10-CM

## 2023-06-14 PROCEDURE — 99213 OFFICE O/P EST LOW 20 MIN: CPT | Performed by: PHYSICIAN ASSISTANT

## 2023-06-14 RX ORDER — CYCLOBENZAPRINE HCL 10 MG
10 TABLET ORAL 3 TIMES DAILY PRN
Qty: 30 TABLET | Refills: 1 | Status: SHIPPED | OUTPATIENT
Start: 2023-06-14

## 2023-06-14 NOTE — LETTER
June 14, 2023     Patient: Stephanie Bowman  YOB: 1960  Date of Visit: 6/14/2023      To Whom it May Concern:    Stephanie Bowman is under my professional care  Akiko was seen in my office on 6/14/2023  Veronicawilmane Everardo may return to work with limitations - she cannot lift more than 10 pounds due to her back issues  She also cannot climb a ladder until her back issues resolve  It is anticipated that her back issues will resolve within 1 month   If you have any questions or concerns, please don't hesitate to call           Sincerely,          Elicia Perez, [facility], PA-C          CC: No Recipients

## 2023-06-14 NOTE — PROGRESS NOTES
Assessment/Plan:       1  Acute left-sided low back pain without sciatica  -     cyclobenzaprine (FLEXERIL) 10 mg tablet; Take 1 tablet (10 mg total) by mouth 3 (three) times a day as needed for muscle spasms    2  Muscle spasm  -     cyclobenzaprine (FLEXERIL) 10 mg tablet; Take 1 tablet (10 mg total) by mouth 3 (three) times a day as needed for muscle spasms        I initially saw this patient on 6/9/2023 after she was working in her yard  She was walking backward and fell backwards after tripping on a device used to tie her dog outside  She had a lot of discomfort at that time but had been doing okay and returned to work  She was taking Tylenol as needed for pain  She also noted increased bruising and the patient had a CBC done along with pro time and PTT without any abnormalities  Patient states that she started developing severe muscle spasm in the left low back area  This has not been present previously  She was using blue goo topically and taking Tylenol but the muscle spasm continued and she was not able to sleep  As part of shared decision making, the patient opted to start Flexeril 10 mg up to 3 times a day as needed  This will help her to sleep  Because she was not sleeping, I suggested maybe sleeping on a recliner until the back spasm resolved  I wrote a note for her to continue working but to have lifting restrictions of 10 pounds and to not be allowed to use a ladder at work which is also stressful on the low back  She states that 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[place], the patient was lifting a load of heavy laundry and developed a back spasm that lasted 6 months  She does not want this to occur again  Patient continues to do well following her ablation for her rapid atrial fib  She is no longer on amiodarone  They are continuing her on Eliquis twice daily which could account for some of her muscle bruising  Patient does not have any red flag symptoms  Particularly, she does not have any saddle anesthesia, bowel or bladder changes, fever or chills or any sciatic symptoms  A total of 28 minutes was spent rendering care for this patient  This time included review of the patient's electronic medical record, performing the history and physical, reviewing appropriate labs and/or images, developing a treatment and assessment plan, answering patient's questions and concerns, and documenting the patient visit  He has an appointment to see me on July 18 and we can discuss doing a pelvic exam at that time  Subjective:      Patient ID: Sivakumar Barrios is a 66 y o  female  HPI: This 29-year-old female sees me for primary care services  I had seen her 5 days ago after sustaining a trip and fall in her yard falling on a grassy area  She did not have any concerning physical exam findings nor historical findings  No head trauma  She was complaining of a lot of bruising and we did a lab session showing a normal CBC pro time and PTT  She is on Eliquis long-term due to her paroxysmal rapid A-fib but in the interim, she has undergone ablation therapy and the symptoms have not recurred  She is complaining of a lot of low back pain only on the left side  She can feel the spasm  She has been using topical blue goo on this area which has not helped any great extent  She is hesitant to use the Voltaren as it is an NSAID and could interfere with her DOAC and even add to some extra bleeding  I told her that she could use a scant amount on the area if the blue glue was not effective  She is also willing to try Flexeril  She denies any headaches  She denies chest pain heart palpitations dizziness lightheadedness syncope or presyncope  She is very concerned about returning to work being assigned to the [facility] [employer] [facility] having to lift very heavy packages    I sent a letter and signed it restricting her lifting to 10 pounds and restricting her climbing the ladder for the next "month  The following portions of the patient's history were reviewed and updated as appropriate: allergies, current medications, past family history, past medical history, past social history, past surgical history, and problem list     Review of Systems  No recent viral or viral syndrome  No other complaints noted  Objective:      /63 (BP Location: Right arm, Patient Position: Sitting, Cuff Size: Standard)   Pulse 63   Temp 98 3 °F (36 8 °C) (Tympanic)   Ht 5' 2\" (1 575 m)   Wt 99 kg (218 lb 4 1 oz)   SpO2 96%   BMI 39 92 kg/m²          Physical Exam  Well-developed well-nourished pleasant 61-year-old female who is alert oriented and nontoxic in appearance  Patient's heart is regular rate without murmur rub or gallops  Lungs are clear to auscultation  Patient's left low back area has 1 area of palpable muscle spasm in the lower lumbar spine  Palpation over this area reproduces her pain  No spasm on the right side  Reflexes are normal and equal   Motor strength in the lower extremities equal without focal deficit  Able to recognize light touch in all dermatomes in the lower extremities    "

## 2023-06-19 DIAGNOSIS — I10 ESSENTIAL HYPERTENSION: Primary | ICD-10-CM

## 2023-06-19 RX ORDER — LOSARTAN POTASSIUM 50 MG/1
TABLET ORAL
Qty: 90 TABLET | Refills: 3 | Status: SHIPPED | OUTPATIENT
Start: 2023-06-19 | End: 2023-09-17

## 2023-07-10 DIAGNOSIS — E78.2 MIXED HYPERLIPIDEMIA: Primary | ICD-10-CM

## 2023-07-10 RX ORDER — ATORVASTATIN CALCIUM 40 MG/1
40 TABLET, FILM COATED ORAL EVERY EVENING
Qty: 30 TABLET | Refills: 4 | Status: SHIPPED | OUTPATIENT
Start: 2023-07-10

## 2023-07-18 ENCOUNTER — OFFICE VISIT (OUTPATIENT)
Dept: FAMILY MEDICINE CLINIC | Facility: CLINIC | Age: 63
End: 2023-07-18
Payer: COMMERCIAL

## 2023-07-18 VITALS
TEMPERATURE: 97.8 F | WEIGHT: 220.9 LBS | DIASTOLIC BLOOD PRESSURE: 63 MMHG | HEIGHT: 62 IN | OXYGEN SATURATION: 98 % | BODY MASS INDEX: 40.65 KG/M2 | SYSTOLIC BLOOD PRESSURE: 139 MMHG | HEART RATE: 67 BPM

## 2023-07-18 DIAGNOSIS — I10 ESSENTIAL HYPERTENSION: Primary | ICD-10-CM

## 2023-07-18 DIAGNOSIS — L72.0 DERMOID INCLUSION CYST: ICD-10-CM

## 2023-07-18 DIAGNOSIS — M54.50 ACUTE MIDLINE LOW BACK PAIN WITHOUT SCIATICA: ICD-10-CM

## 2023-07-18 PROCEDURE — 99213 OFFICE O/P EST LOW 20 MIN: CPT | Performed by: PHYSICIAN ASSISTANT

## 2023-07-18 RX ORDER — FUROSEMIDE 40 MG/1
40 TABLET ORAL 2 TIMES DAILY
Qty: 180 TABLET | Refills: 2 | Status: SHIPPED | OUTPATIENT
Start: 2023-07-18

## 2023-07-18 NOTE — PROGRESS NOTES
Assessment/Plan:       1. Essential hypertension  -     furosemide (LASIX) 40 mg tablet; Take 1 tablet (40 mg total) by mouth 2 (two) times a day    2. Dermoid inclusion cyst    3. Acute midline low back pain without sciatica        This 72-year-old female sees me for primary care services. She had been working at [employer] at [facility] and the work became too cumbersome for her. She resigned from that position and is currently looking for another job. She states that she had her ablation for her recurrent atrial fibrillation and she just never returned to that job. She is worried because she has a $10,000 remaining bill from that ablation and she does not know how she is going to pay. She is looking into insurance that she is currently paying for may be switching from the [facility] to [facility] which is more affordable for her. Patient has not had any recurrence of her paroxysmal A-fib. She continues to be maintained on apixaban and the cardiologist will make the decision if and when to discontinue this. She has not felt any tachycardia. She takes her losartan at night and it is doing a good job controlling her high blood pressure. She asked me to check an abnormality on her back and she has an inclusion cyst on the right side of her upper back. No signs of inflammation or infection or any drainage. This would be a cosmetic removal as there is no malignancy associated with this. She continues to have some mild midline low back pain. She is no longer having left-sided sciatica. She was not able to tolerate Flexeril as it made her very dizzy and hung over the next day. She has not had a pelvic exam done in quite some time and she is willing to come in and have a pelvic and Pap smear done at her next appointment. A total of 25 minutes was spent rendering care for this patient.   This time included review of the patient's electronic medical record, performing the history and physical, reviewing appropriate labs and/or images, developing a treatment and assessment plan, answering patient's questions and concerns, and documenting the patient visit. We will see her in 1 month pelvic and Pap smear. Subjective:      Patient ID: Maico Mcmillan is a 66 y.o. female. HPI: This is a follow-up appointment for this patient. She recently had severe back pain with left-sided sciatica that gradually improved. 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 Patient successfully underwent ablation therapy and she has had no recurrence of symptoms. She has no pain in her chest palpitations dizziness lightheadedness syncope or presyncope. The patient's blood pressure remains under excellent control taking losartan at night. Her BMI is elevated at 40. 4. BMI is above normal. Nutrition recommendations include reducing portion sizes and decreasing overall calorie intake. A total of 28 minutes was spent rendering care for this patient. This time included review of the patient's electronic medical record, performing the history and physical, reviewing appropriate labs and/or images, developing a treatment and assessment plan, answering patient's questions and concerns, and documenting the patient visit. The following portions of the patient's history were reviewed and updated as appropriate: allergies, current medications, past family history, past medical history, past social history, past surgical history, and problem list.  I will see her in 1 month to do her pelvic and Pap smear. Review of Systems  Patient has a cystlike structure on the left upper back that she asked me to check. She has no active drainage or pain associated with this.   She has had several inclusion cyst in the past.    Objective:      /63 (BP Location: Right arm, Patient Position: Sitting, Cuff Size: Large) Pulse 67   Temp 97.8 °F (36.6 °C) (Tympanic)   Ht 5' 2" (1.575 m)   Wt 100 kg (220 lb 14.4 oz)   SpO2 98%   BMI 40.40 kg/m²          Physical Exam  Developed well-nourished 21-year-old female who is alert oriented and appropriate and answering questions. Patient's heart is regular rate without murmur rub or gallop. No tenderness on palpation of her back. She does have an inclusion cyst in the right upper back area that is fluctuant soft and does have 1 area for potential drainage. No signs of inflammation irritation or open areas are present.

## 2023-08-17 ENCOUNTER — TELEPHONE (OUTPATIENT)
Dept: FAMILY MEDICINE CLINIC | Facility: CLINIC | Age: 63
End: 2023-08-17

## 2023-08-17 NOTE — TELEPHONE ENCOUNTER
Left a VM to call the office if she would like to reschedule her appt. , as she was a no shoe today.

## 2023-09-08 ENCOUNTER — OFFICE VISIT (OUTPATIENT)
Dept: FAMILY MEDICINE CLINIC | Facility: CLINIC | Age: 63
End: 2023-09-08
Payer: COMMERCIAL

## 2023-09-08 VITALS
WEIGHT: 218.7 LBS | HEART RATE: 74 BPM | DIASTOLIC BLOOD PRESSURE: 66 MMHG | BODY MASS INDEX: 40.25 KG/M2 | OXYGEN SATURATION: 93 % | HEIGHT: 62 IN | SYSTOLIC BLOOD PRESSURE: 138 MMHG | TEMPERATURE: 98.4 F

## 2023-09-08 DIAGNOSIS — R20.0 NUMBNESS AND TINGLING IN BOTH HANDS: ICD-10-CM

## 2023-09-08 DIAGNOSIS — J06.9 ACUTE URI: Primary | ICD-10-CM

## 2023-09-08 DIAGNOSIS — R20.2 NUMBNESS AND TINGLING IN BOTH HANDS: ICD-10-CM

## 2023-09-08 DIAGNOSIS — I51.3 MURAL THROMBUS OF CARDIAC APEX: ICD-10-CM

## 2023-09-08 DIAGNOSIS — R19.7 DIARRHEA, UNSPECIFIED TYPE: ICD-10-CM

## 2023-09-08 DIAGNOSIS — F17.290 VAPING NICOTINE DEPENDENCE, TOBACCO PRODUCT: ICD-10-CM

## 2023-09-08 DIAGNOSIS — I10 ESSENTIAL HYPERTENSION: ICD-10-CM

## 2023-09-08 DIAGNOSIS — F41.1 GAD (GENERALIZED ANXIETY DISORDER): ICD-10-CM

## 2023-09-08 DIAGNOSIS — I48.0 PAROXYSMAL ATRIAL FIBRILLATION (HCC): ICD-10-CM

## 2023-09-08 LAB
SARS-COV-2 AG UPPER RESP QL IA: NEGATIVE
VALID CONTROL: NORMAL

## 2023-09-08 PROCEDURE — 99214 OFFICE O/P EST MOD 30 MIN: CPT | Performed by: PHYSICIAN ASSISTANT

## 2023-09-08 PROCEDURE — 87811 SARS-COV-2 COVID19 W/OPTIC: CPT | Performed by: PHYSICIAN ASSISTANT

## 2023-09-08 RX ORDER — BUSPIRONE HYDROCHLORIDE 7.5 MG/1
7.5 TABLET ORAL 2 TIMES DAILY
Qty: 60 TABLET | Refills: 5 | Status: SHIPPED | OUTPATIENT
Start: 2023-09-08

## 2023-09-08 RX ORDER — GABAPENTIN 300 MG/1
300 CAPSULE ORAL
Qty: 30 CAPSULE | Refills: 3 | Status: SHIPPED | OUTPATIENT
Start: 2023-09-08

## 2023-09-08 RX ORDER — APIXABAN 5 MG/1
5 TABLET, FILM COATED ORAL 2 TIMES DAILY
Qty: 60 TABLET | Refills: 3 | Status: SHIPPED | OUTPATIENT
Start: 2023-09-08

## 2023-09-08 NOTE — LETTER
September 8, 2023     Patient: Emmanuel Caba  YOB: 1960  Date of Visit: 9/8/2023      To Whom it May Concern:    Emmanuel Caba is under my professional care. Akiko was seen in my office on 9/8/2023. Verneda Siemens may return to work on September 11, 2023 . If you have any questions or concerns, please don't hesitate to call.          Sincerely,      Etta Gavin, PAMelisaC          CC: No Recipients

## 2023-09-08 NOTE — PROGRESS NOTES
Assessment/Plan:       1. Acute URI  -     POCT Rapid Covid Ag    2. Diarrhea, unspecified type  -     POCT Rapid Covid Ag    3. VERONICA (generalized anxiety disorder)  -     busPIRone (BUSPAR) 7.5 mg tablet; Take 1 tablet (7.5 mg total) by mouth 2 (two) times a day    4. Numbness and tingling in both hands  -     gabapentin (Neurontin) 300 mg capsule; Take 1 capsule (300 mg total) by mouth daily at bedtime    5. Vaping nicotine dependence, tobacco product    6. BMI 40.0-44.9, adult ([address])    7. Essential hypertension        57-year-old female sees me for her primary care services. Patient states that she developed acute URI symptoms approximately 4 days ago. She has been coughing and her chest is sore underneath her right breast beneath her sternum. She has not had any fever or chills. Last night, she developed diarrhea. She is not sure if it was something that she ate that disagreed with her. Patient is back to working at the [employer]. She has a lot of contact with the public. She denies any hearing loss or tinnitus. She has no fullness in her ears. No sinus congestion. Has a lot of postnasal drainage which has resulted in coughing. No rash on her body. Her new complaint is numbness and tingling in both hands and in the left lower extremity. This has been present for the last 1 week. .  Patient denies any unusual stress. She is very unhappy at work and is also trying to take care of her debilitated  and this is very stressful for her. She admits that she has had increased amount of stress. Although she stopped smoking 3 years ago, she has now started vaping and nicotine products. She is vaping in order to try to relax. A VERONICA-7 score was 9 showing mild anxiety. She is going to start buspirone for the anxiety and I have encouraged her to stop the vaping. Her PHQ 2 score was 0. She is concerned about the numbness and tingling.   Sometimes she gets a compressive neuropathy when she lays on her right side sleeping. She is interested in trying gabapentin at night to see if this affects her paresthesias numbness and tingling. A total of 30 minutes was spent rendering care for this patient. This time included review of the patient's electronic medical record, performing the history and physical, reviewing appropriate labs and/or images, developing a treatment and assessment plan, answering patient's questions and concerns, and documenting the patient visit. Patient will see me in 4 weeks. We will assess her response to treatment. Subjective:      Patient ID: Renata Ji is a 66 y.o. female. HPI: 59-year-old female who sees me for primary care services. She was very sick last year with pneumonia and she does not feel sick today. POC rapid COVID antigen test was negative. She has not been around anybody that she knows has had COVID but she does work in [employer]. She had diarrhea x1 last night and this has not recurred. She does have some coryza and some runny nose. She is adherent with her blood pressure medications and blood pressure is controlled today. She is not having radiation of the chest pain. No jaw pain diaphoresis or nausea. She admits to being under a lot of stress and having a lot of anxiety related to her job taking care of her  and her job in the [employer] [facility] [employer]. She admits to vaping in order to decrease her anxiety. She is willing to try buspirone for this. She has had no complications from her patient therapy in her heart. She did have a $10,000 bill and did appeal this and did receive abby care so she is not responsible for the remainder of the bill. This has been a source of noise for her that she does not have this outstanding bill.     The following portions of the patient's history were reviewed and updated as appropriate: allergies, current medications, past family history, past medical history, past social history, past surgical history, and problem list.    Review of Systems  No difficulty passing her water. No peripheral edema. No joint aches or pains. No wheezing. No shortness of breath upon exertion. She is coughing but not producing sputum. Objective:      /66 (BP Location: Right arm, Patient Position: Sitting, Cuff Size: Standard)   Pulse 74   Temp 98.4 °F (36.9 °C) (Tympanic)   Ht 5' 2" (1.575 m)   Wt 99.2 kg (218 lb 11.1 oz)   SpO2 93%   BMI 40.00 kg/m²          Physical Exam  Well-developed well-nourished 59-year-old female who is alert oriented and cooperative with exam.  She is sick in appearance. She is appropriate with the exam.  No signs of confusion. No sinus tenderness on palpation no nuchal adenopathy. Heart is regular rate without murmur rub or gallops. Lungs are clear to auscultation. No egophony. Moving air well. Aerating bases without difficulty. No egophony. No areas of consolidation or rales. No wheezes or rhonchi. No peripheral edema and adequate peripheral pulses.

## 2023-10-09 ENCOUNTER — OFFICE VISIT (OUTPATIENT)
Dept: FAMILY MEDICINE CLINIC | Facility: CLINIC | Age: 63
End: 2023-10-09
Payer: COMMERCIAL

## 2023-10-09 VITALS
DIASTOLIC BLOOD PRESSURE: 71 MMHG | SYSTOLIC BLOOD PRESSURE: 128 MMHG | WEIGHT: 220.24 LBS | HEART RATE: 68 BPM | OXYGEN SATURATION: 95 % | TEMPERATURE: 99.1 F | HEIGHT: 62 IN | BODY MASS INDEX: 40.53 KG/M2

## 2023-10-09 DIAGNOSIS — R20.2 NUMBNESS AND TINGLING IN BOTH HANDS: ICD-10-CM

## 2023-10-09 DIAGNOSIS — I10 ESSENTIAL HYPERTENSION: Primary | ICD-10-CM

## 2023-10-09 DIAGNOSIS — F17.290 VAPING NICOTINE DEPENDENCE, TOBACCO PRODUCT: ICD-10-CM

## 2023-10-09 DIAGNOSIS — G56.01 ACUTE CARPAL TUNNEL SYNDROME OF RIGHT WRIST: ICD-10-CM

## 2023-10-09 DIAGNOSIS — Z23 NEED FOR INFLUENZA VACCINATION: ICD-10-CM

## 2023-10-09 DIAGNOSIS — M19.011 ARTHRITIS OF RIGHT STERNOCLAVICULAR JOINT: ICD-10-CM

## 2023-10-09 DIAGNOSIS — R20.0 NUMBNESS AND TINGLING IN BOTH HANDS: ICD-10-CM

## 2023-10-09 DIAGNOSIS — F41.1 GAD (GENERALIZED ANXIETY DISORDER): ICD-10-CM

## 2023-10-09 PROCEDURE — 90686 IIV4 VACC NO PRSV 0.5 ML IM: CPT | Performed by: PHYSICIAN ASSISTANT

## 2023-10-09 PROCEDURE — 90471 IMMUNIZATION ADMIN: CPT | Performed by: PHYSICIAN ASSISTANT

## 2023-10-09 PROCEDURE — 99213 OFFICE O/P EST LOW 20 MIN: CPT | Performed by: PHYSICIAN ASSISTANT

## 2023-10-09 NOTE — PROGRESS NOTES
Assessment/Plan:       1. Essential hypertension    2. VERONICA (generalized anxiety disorder)    3. Numbness and tingling in both hands    4. Vaping nicotine dependence, tobacco product    5. BMI 40.0-44.9, adult ([address])    6. Need for influenza vaccination  -     influenza vaccine, quadrivalent, 0.5 mL, preservative-free, for adult and pediatric patients 6 mos+ (AFLURIA, FLUARIX, FLULAVAL, FLUZONE)    7. Acute carpal tunnel syndrome of right wrist    8. Arthritis of right sternoclavicular joint        28-year-old female who sees me for primary care services. Patient works part-time at [employer] in [facility] [employer]. She worked 8 hours yesterday. This seems to cause increased paresthesias in both hands right hand greater than left hand. Patient was started on gabapentin for her paresthesias but states that she just did not feel right on the medication and stopped this. Patient previously had a fractured radius and ulna in the left forearm. She then fractured her left wrist.  She opted not to have surgery despite having obvious deformity in the left wrist because they told her that they were going to remove the plate and screws from her previous left forearm fracture. She continues to have a deformity along the distal ulna but she is not having any increased pain in this area. Patient is right-handed dominant. Patient's paresthesias are worse if she lays on that side sleeping. She has numbness and tingling and feels as if she is dropping things on the right hand. She is going to try conservative treatment with wearing a wrist splint to see if this helps with her paresthesias. Patient is complaining of arthritis pain on the right sternoclavicular joint. Patient had seen Dr. Frida Urena in the past and he told her that he could surgically smooth this area out.   She is not interested in having surgery in this area but she has a lot of stiffness causing problems with shoulder mobility as a result of this arthritis. Patient has recovered uneventfully from her previous URI 1 month ago. She is requesting and did receive a flu shot. We talked about the COVID booster and she does not want to have this done. Patient has not had a pelvic and Pap for quite some time. She is aware we do these in the office but she is declining at this time. She did receive Cologuard and also another stool test at her home. She will be sending these in. Patient's BMI is 40. She has gained 2 pounds. She is committed to trying to lose weight rather than gaining. A total of 28 minutes was spent rendering care for this patient. This time included review of the patient's electronic medical record, performing the history and physical, reviewing appropriate labs and/or images, developing a treatment and assessment plan, answering patient's questions and concerns, and documenting the patient visit. I will see her in 4 months. Subjective:      Patient ID: Beryle Gravely is a 66 y.o. female. HPI: 37-year-old female who is alert and oriented and cooperative with the exam.  She does have a lot of complaints related to her right wrist and hand with numbness and tingling. She has minor symptoms on her left side but not as much. She is willing to try a wrist splint in order to see if this helps. We did discuss the need for nerve conduction studies in the future if she should decide that she would like to pursue surgery for carpal tunnel release. She has recovered uneventfully from her respiratory infection. She continues to work part-time at [employer] at [employer]. She is thinking about starting to collect Social Security next year when she turns 60. She thinks this would help to supplement her income while keeping a part-time job.     The following portions of the patient's history were reviewed and updated as appropriate: allergies, current medications, past family history, past medical history, past social history, past surgical history, and problem list.    Review of Systems  Recently recovered from URI. Had some dizziness a few days ago lasting about 30 minutes and resolved on its own without treatment. Objective:      /71 (BP Location: Right arm, Patient Position: Sitting, Cuff Size: Standard)   Pulse 68   Temp 99.1 °F (37.3 °C) (Tympanic)   Ht 5' 2" (1.575 m)   Wt 99.9 kg (220 lb 3.8 oz)   SpO2 95%   BMI 40.28 kg/m²          Physical Exam Well-developed well-nourished 53-year-old female alert oriented cooperative with the exam.  Vital signs reviewed. Blood pressure well controlled. Has gained 2 pounds. Heart is regular rate without murmur rub or gallops. Lungs are clear to auscultation. Her VERONICA score went from 7 mild depression to 3 minimal depression. She is taking 1 BuSpar per day and feels this is perfect for her. MSK: Patient has positive Phalen test on the right. Negative Tinel sign bilaterally. No decreased strength with opposition. No thenar atrophy.

## 2023-11-08 ENCOUNTER — APPOINTMENT (OUTPATIENT)
Dept: LAB | Facility: CLINIC | Age: 63
End: 2023-11-08
Payer: COMMERCIAL

## 2023-11-08 ENCOUNTER — OFFICE VISIT (OUTPATIENT)
Dept: FAMILY MEDICINE CLINIC | Facility: CLINIC | Age: 63
End: 2023-11-08
Payer: COMMERCIAL

## 2023-11-08 VITALS
BODY MASS INDEX: 40.41 KG/M2 | HEIGHT: 62 IN | SYSTOLIC BLOOD PRESSURE: 120 MMHG | WEIGHT: 219.58 LBS | OXYGEN SATURATION: 98 % | HEART RATE: 67 BPM | DIASTOLIC BLOOD PRESSURE: 72 MMHG

## 2023-11-08 DIAGNOSIS — R23.3 EASY BRUISING: ICD-10-CM

## 2023-11-08 DIAGNOSIS — R20.2 NUMBNESS AND TINGLING IN BOTH HANDS: ICD-10-CM

## 2023-11-08 DIAGNOSIS — M54.12 CERVICAL RADICULOPATHY: Primary | ICD-10-CM

## 2023-11-08 DIAGNOSIS — Z79.01 CURRENT USE OF LONG TERM ANTICOAGULATION: ICD-10-CM

## 2023-11-08 DIAGNOSIS — F41.1 GAD (GENERALIZED ANXIETY DISORDER): ICD-10-CM

## 2023-11-08 DIAGNOSIS — M62.838 MUSCLE SPASM: ICD-10-CM

## 2023-11-08 DIAGNOSIS — R20.0 NUMBNESS AND TINGLING IN BOTH HANDS: ICD-10-CM

## 2023-11-08 DIAGNOSIS — J45.20 MILD INTERMITTENT ASTHMA WITHOUT COMPLICATION: ICD-10-CM

## 2023-11-08 DIAGNOSIS — E87.6 HYPOKALEMIA: ICD-10-CM

## 2023-11-08 DIAGNOSIS — I10 ESSENTIAL HYPERTENSION: ICD-10-CM

## 2023-11-08 LAB
ANION GAP SERPL CALCULATED.3IONS-SCNC: 7 MMOL/L
BUN SERPL-MCNC: 12 MG/DL (ref 5–25)
CALCIUM SERPL-MCNC: 9.8 MG/DL (ref 8.4–10.2)
CHLORIDE SERPL-SCNC: 103 MMOL/L (ref 96–108)
CO2 SERPL-SCNC: 31 MMOL/L (ref 21–32)
CREAT SERPL-MCNC: 0.72 MG/DL (ref 0.6–1.3)
ERYTHROCYTE [DISTWIDTH] IN BLOOD BY AUTOMATED COUNT: 13.3 % (ref 11.6–15.1)
GFR SERPL CREATININE-BSD FRML MDRD: 90 ML/MIN/1.73SQ M
GLUCOSE SERPL-MCNC: 100 MG/DL (ref 65–140)
HCT VFR BLD AUTO: 41.8 % (ref 34.8–46.1)
HGB BLD-MCNC: 13.8 G/DL (ref 11.5–15.4)
MCH RBC QN AUTO: 31.2 PG (ref 26.8–34.3)
MCHC RBC AUTO-ENTMCNC: 33 G/DL (ref 31.4–37.4)
MCV RBC AUTO: 94 FL (ref 82–98)
PLATELET # BLD AUTO: 181 THOUSANDS/UL (ref 149–390)
PMV BLD AUTO: 13.6 FL (ref 8.9–12.7)
POTASSIUM SERPL-SCNC: 4.2 MMOL/L (ref 3.5–5.3)
RBC # BLD AUTO: 4.43 MILLION/UL (ref 3.81–5.12)
SODIUM SERPL-SCNC: 141 MMOL/L (ref 135–147)
WBC # BLD AUTO: 6.13 THOUSAND/UL (ref 4.31–10.16)

## 2023-11-08 PROCEDURE — 36415 COLL VENOUS BLD VENIPUNCTURE: CPT

## 2023-11-08 PROCEDURE — 85027 COMPLETE CBC AUTOMATED: CPT

## 2023-11-08 PROCEDURE — 99214 OFFICE O/P EST MOD 30 MIN: CPT | Performed by: PHYSICIAN ASSISTANT

## 2023-11-08 PROCEDURE — 80048 BASIC METABOLIC PNL TOTAL CA: CPT

## 2023-11-08 RX ORDER — ALBUTEROL SULFATE 90 UG/1
2 AEROSOL, METERED RESPIRATORY (INHALATION) EVERY 6 HOURS PRN
Qty: 18 G | Refills: 1 | Status: SHIPPED | OUTPATIENT
Start: 2023-11-08

## 2023-11-24 ENCOUNTER — OFFICE VISIT (OUTPATIENT)
Dept: OBGYN CLINIC | Facility: CLINIC | Age: 63
End: 2023-11-24
Payer: COMMERCIAL

## 2023-11-24 ENCOUNTER — HOSPITAL ENCOUNTER (OUTPATIENT)
Dept: RADIOLOGY | Facility: CLINIC | Age: 63
End: 2023-11-24
Payer: COMMERCIAL

## 2023-11-24 VITALS
HEART RATE: 67 BPM | TEMPERATURE: 97.6 F | WEIGHT: 216.4 LBS | HEIGHT: 62 IN | SYSTOLIC BLOOD PRESSURE: 120 MMHG | BODY MASS INDEX: 39.82 KG/M2 | DIASTOLIC BLOOD PRESSURE: 70 MMHG

## 2023-11-24 DIAGNOSIS — M54.2 NECK PAIN: Primary | ICD-10-CM

## 2023-11-24 DIAGNOSIS — M79.2 RADICULAR PAIN IN RIGHT ARM: ICD-10-CM

## 2023-11-24 DIAGNOSIS — M75.41 IMPINGEMENT SYNDROME OF RIGHT SHOULDER: ICD-10-CM

## 2023-11-24 DIAGNOSIS — R20.2 NUMBNESS AND TINGLING OF RIGHT ARM: ICD-10-CM

## 2023-11-24 DIAGNOSIS — G56.01 CARPAL TUNNEL SYNDROME ON RIGHT: ICD-10-CM

## 2023-11-24 DIAGNOSIS — R20.0 NUMBNESS AND TINGLING OF RIGHT ARM: ICD-10-CM

## 2023-11-24 DIAGNOSIS — M54.2 NECK PAIN: ICD-10-CM

## 2023-11-24 DIAGNOSIS — M25.511 ACUTE PAIN OF RIGHT SHOULDER: ICD-10-CM

## 2023-11-24 DIAGNOSIS — M19.011 ARTHRITIS OF RIGHT ACROMIOCLAVICULAR JOINT: ICD-10-CM

## 2023-11-24 PROCEDURE — 99204 OFFICE O/P NEW MOD 45 MIN: CPT | Performed by: STUDENT IN AN ORGANIZED HEALTH CARE EDUCATION/TRAINING PROGRAM

## 2023-11-24 PROCEDURE — 20610 DRAIN/INJ JOINT/BURSA W/O US: CPT | Performed by: STUDENT IN AN ORGANIZED HEALTH CARE EDUCATION/TRAINING PROGRAM

## 2023-11-24 PROCEDURE — 72040 X-RAY EXAM NECK SPINE 2-3 VW: CPT

## 2023-11-24 RX ADMIN — BUPIVACAINE HYDROCHLORIDE 2 ML: 2.5 INJECTION, SOLUTION INFILTRATION; PERINEURAL at 11:00

## 2023-11-24 RX ADMIN — TRIAMCINOLONE ACETONIDE 40 MG: 40 INJECTION, SUSPENSION INTRA-ARTICULAR; INTRAMUSCULAR at 11:00

## 2023-11-24 NOTE — PROGRESS NOTES
1. Neck pain  XR spine cervical 2 or 3 vw injury      2. Numbness and tingling of right arm  XR spine cervical 2 or 3 vw injury    Cock Up Wrist Splint      3. Radicular pain in right arm  XR spine cervical 2 or 3 vw injury      4. Carpal tunnel syndrome on right  Cock Up Wrist Splint      5. Acute pain of right shoulder  Large joint arthrocentesis: R subacromial bursa      6. Impingement syndrome of right shoulder  Large joint arthrocentesis: R subacromial bursa        Orders Placed This Encounter   Procedures    Large joint arthrocentesis: R subacromial bursa    Cock Up Wrist Splint    XR spine cervical 2 or 3 vw injury        Imaging Studies (I personally reviewed images in PACS and report):    X-ray cervical spine 11/24/2023: No acute osseous abnormalities. Mild multilevel degenerative changes of the cervical spine. X-ray right shoulder 5/2/2022: Via [facility]. There are no images available but there is a report noting "Again noted is prominent acromioclavicular joint osteoarthritis, which could contribute to impingement. "      IMPRESSION:  Subacute neck,right shoulder pain  Right upper extremity paresthesias; aggravated from lying on right side and with use of RUE  Multifactorial causes potentially based on exam/history with differential including : Shoulder impingement secondary to OA of right AC joint of right shoulder, CTS of right upper extremity (positive carpal tunnel compression test, Phalen's on exam), cervical radicular symptoms      Other factors:  NSAIDs c/I given use of Eliquis    PLAN:    Clinical exam and radiographic imaging reviewed with patient today, with impression as per above. I have discussed with the patient the pathophysiology of this diagnosis and reviewed how the examination correlates with this diagnosis. Imaging obtained of her cervical spine as noted above today. I also reviewed prior right shoulder x-rays as noted above.   Treatment options were discussed at length, including risks and benefits; after discussing these treatment options, the patient elected for a right subacromial cortisone injection today to for diagnostic/therapeutic purposes as there may be a degree of pain and symptoms secondary to impingement syndrome concurrent with radicular pain. Furthermore, I prescribed her a universal wrist brace for her right hand to be used at night  I did offer formal physical therapy referral but patient states that due to financial constraints, she reports doing home exercises already for her neck pain/right shoulder pain previously. Plan close follow-up in approximately 2 weeks to reevaluate her progress. Could consider obtaining an MRI of her cervical spine along with referral to pain management if there is progressive improvement of her symptoms. Patient is concerned about obtaining an MRI as she states several surgeries of her right upper and lower extremity that involved plates and screws. She is noted to have an MRI of her left knee in 2017 already (patient had the surgery is already in place at this point did not have any other metal hardware applied after 2017 to her body). Return in about 2 weeks (around 12/8/2023). Portions of the record may have been created with voice recognition software. Occasional wrong word or "sound a like" substitutions may have occurred due to the inherent limitations of voice recognition software. Read the chart carefully and recognize, using context, where substitutions have occurred.      CHIEF COMPLAINT:  Chief Complaint   Patient presents with    Right Shoulder - Pain         HPI:  Marion Vyas is a 66 y.o. female  who presents in regards to referral from [address] for       Visit 11/24/2023:  Initial evaluation of right shoulder pain:  Of note patient concurrently also complains of right paracervical neck pain that radiates down her right upper extremity into her hand  She reports paresthesias of her hands as well in a nondermatomal distribution  Ongoing issue for the past 2 months without a precipitating injury  Pain is aggravated with any range of motion movements of her shoulder as well as her neck. Unable to describe the pain other than that it is constant and of moderate to severe intensity. Pain can also be interfere with her sleep at night as it is aggravated when lying on her right lateral side. She reports paresthesias of her hands can occur at any point in the day. Despite the symptoms, denies right upper extremity swelling, discoloration. Denies crepitus of right shoulder. Denies surgeries of her neck or right shoulder in the past.  She has prior imaging of her shoulder back in May 2023. Denies relief from acetaminophen and NSAIDs. Patient is aware that she is not supposed to take NSAIDs given she is on Eliquis.         Medical, Surgical, Family, and Social History    Past Medical History:   Diagnosis Date    Allergic     Arthritis     COPD (chronic obstructive pulmonary disease) ([address])     Coronary artery disease     Diverticulitis of colon     Headache(784.0)     Shingles     Sleep apnea      Past Surgical History:   Procedure Laterality Date    CARDIAC SURGERY  2023     SECTION  ,     KNEE SURGERY       Social History   Social History     Substance and Sexual Activity   Alcohol Use Yes    Comment: occassionally     Social History     Substance and Sexual Activity   Drug Use Never     Social History     Tobacco Use   Smoking Status Former    Types: Cigarettes    Quit date: 2019    Years since quittin.9   Smokeless Tobacco Never     Family History   Problem Relation Age of Onset    Heart attack Father 62     Allergies   Allergen Reactions    Ace Inhibitors Cough          Physical Exam  /70   Pulse 67   Temp 97.6 °F (36.4 °C) (Temporal)   Ht 5' 2" (1.575 m)   Wt 98.2 kg (216 lb 6.4 oz)   BMI 39.58 kg/m²     Constitutional:  see vital signs  Gen: Obese, normocephalic/atraumatic, well-groomed  Pulmonary/Chest: Effort normal. No respiratory distress. Right Hand Exam     Tenderness   The patient is experiencing no tenderness. Range of Motion   Wrist   Extension:  40   Flexion:  60   Pronation:  90   Supination:  90     Muscle Strength   Wrist extension: 5/5   Wrist flexion: 5/5   : 5/5     Tests   Phalen’s sign: positive  Tinel's sign (median nerve): negative  Finkelstein's test: negative    Other   Erythema: absent    Comments:  Carpal compression test: Positive as she endorses worsening tingling sensation of all her digits      Left Hand Exam     Comments:  Carpal compression test: Negative          Shoulder exam:       RIGHT    Inspection Erythema None     Swelling None     Increased Warmth None    Rotator cuff ER 5/5     IR 5/5     Abduction 5/5    ROM      Abduction 130     ER0 60     IRb L1 but symmetrical with contralateral side    TTP:  Diffusely throughout the shoulder )[facility] with a known AC arthritis). Most pain over posterior and lateral aspects of her shoulder over the greater tuberosity as well as her right paracervical neck over trapezius    Special Tests: O'Briens  (FF 90, add 10, resist thumbs up-, resist thumbs down+) Negative slap    Cross body Adduction Negative     Speeds  Negative     Yergason's Negative     Drop arm Negative     Neer Positive     Patton Positive        UE NV Exam: +2 Radial pulses bilaterally  Sensation intact to light touch C5-T1 bilaterally  Ok sign (median nerve): intact  Froment sign (ulnar nerve): intact  Thumb extension (radial nerve): 5/5 and intact      Right  elbow, wrist, and and forearm ROM full, painless with passive ROM, no ttp or crepitance throughout extremities below shoulder joint    Cervical  ROM: Limited but intact; all range of motion movements aggravate her midline right paracervical neck.   Midline spinous process tenderness: + C6  Muscular Tenderness: + Right paracervical  Sensation UE Bilateral:  C5: normal  C6: normal  C7: normal  C8: normal  T1: normal  Spurlings: Negative bilaterally but aggravates neck pain                   Large joint arthrocentesis: R subacromial bursa  Universal Protocol:  Consent: Verbal consent obtained. Risks and benefits: risks, benefits and alternatives were discussed  Consent given by: patient  Patient understanding: patient states understanding of the procedure being performed  Site marked: the operative site was marked  Radiology Images displayed and confirmed.  If images not available, report reviewed: imaging studies available  Patient identity confirmed: verbally with patient  Supporting Documentation  Indications: pain and diagnostic evaluation   Procedure Details  Location: shoulder - R subacromial bursa  Preparation: Patient was prepped and draped in the usual sterile fashion  Needle size: 22 G  Ultrasound guidance: no  Approach: posterior  Medications administered: 40 mg triamcinolone acetonide 40 mg/mL; 2 mL bupivacaine 0.25 %    Patient tolerance: patient tolerated the procedure well with no immediate complications  Dressing:  Sterile dressing applied

## 2023-11-24 NOTE — PATIENT INSTRUCTIONS
CORTICOSTEROID INJECTION  What is a corticosteroid? Injuries or disease such as arthritis, bursitis or tendonitis result in inflammation. In turn, this inflammation can cause swelling and pain. A local injection of a corticosteroid is provided to diminish inflammation. By doing so, it will also decrease pain and swelling which is making you uncomfortable. Is this the same thing as a Cortisone Injection? Cortisone® is a brand name of a corticosteroid used commonly in the past.  Today I commonly use a more water-soluble corticosteroid named Celestone®. Will the injection hurt? As with any injection, you may feel pain at the time of the injection. Typically, I use a local anesthetic (Stephanie Lopez) in addition to the corticosteroid to determine if the injection has been placed in the appropriate location. Hence it is important to monitor your symptoms 4-6 hours after the injection, as the area will be anesthetized (numb) while the local anesthetic is working. Once the local anesthetic wears off, the intensity of pain can be the same as it was prior to the injection, or even worse. This does not mean that the injection is not working. The corticosteroid may take 24-72 hours to begin having a positive effect. If you do experience an increase in pain, the use of an ice pack on the area for 20 minutes at a time should help. It is also helpful to take an oral anti-inflammatory such as Tylenol® or Motrin® if you are able to medically do so. For this reason it is best to avoid activities that put stress on the area the first 24 hours after the injection. How long will pain relief last?  This will vary according to the type and severity of the symptoms being treated and the severity of the condition. Symptom relief may last weeks to months. I typically couple injections with physical therapy so that the underlying problem causing the inflammation may be treated as the pain diminishes.   If the combination is not successful, you may be a surgical candidate. I have read bad things about steroids. Will these things happen to me? Corticosteroids, when utilized properly, are safe and effective drugs. When used in a low dose, potential adverse reactions are very rare. Some patients may experience a sensation of flushing for several days. Some can experience feelings of agitation. Others may have depigmentation and dimpling of the overlying skin at the site of the injection. Very rarely, there can be a local reaction which may include increased discomfort for a period of time in the areas that has been injected. A steroid should not be used over and over again. Multiple injections in the same area can produce adverse effects such as tissue atrophy and degeneration of tendon or cartilage. A small percentage of patients (< 0.1%) may develop an infection in the joint after injection. This is a treatable problem, but if neglected, may result in permanent disability. Signs of infection include redness, swelling, discharge, fevers/chills, increasing pain and drainage from the injection site. This represents an emergency and you should contact our office immediately or seek treatment in the ER if after hours. If I have diabetes, will this injection affect me? If you are diabetic, an injection of a corticosteroid can raise your blood sugar level, requiring more insulin for a brief period of time. This may necessitate careful blood sugar maintenance. If the elevated sugars are not able to be controlled, contact your diabetic doctor for guidance.

## 2023-11-29 PROBLEM — M19.011 ARTHRITIS OF RIGHT ACROMIOCLAVICULAR JOINT: Status: ACTIVE | Noted: 2023-11-29

## 2023-11-29 RX ORDER — TRIAMCINOLONE ACETONIDE 40 MG/ML
40 INJECTION, SUSPENSION INTRA-ARTICULAR; INTRAMUSCULAR
Status: COMPLETED | OUTPATIENT
Start: 2023-11-24 | End: 2023-11-24

## 2023-11-29 RX ORDER — BUPIVACAINE HYDROCHLORIDE 2.5 MG/ML
2 INJECTION, SOLUTION INFILTRATION; PERINEURAL
Status: COMPLETED | OUTPATIENT
Start: 2023-11-24 | End: 2023-11-24

## 2023-12-12 ENCOUNTER — VBI (OUTPATIENT)
Dept: ADMINISTRATIVE | Facility: OTHER | Age: 63
End: 2023-12-12

## 2023-12-14 DIAGNOSIS — E78.2 MIXED HYPERLIPIDEMIA: ICD-10-CM

## 2023-12-14 DIAGNOSIS — I10 PRIMARY HYPERTENSION: Primary | ICD-10-CM

## 2023-12-14 RX ORDER — METOPROLOL SUCCINATE 25 MG/1
25 TABLET, EXTENDED RELEASE ORAL 2 TIMES DAILY
Qty: 60 TABLET | Refills: 0 | Status: SHIPPED | OUTPATIENT
Start: 2023-12-14 | End: 2023-12-18 | Stop reason: SDUPTHER

## 2023-12-14 RX ORDER — ATORVASTATIN CALCIUM 40 MG/1
40 TABLET, FILM COATED ORAL EVERY EVENING
Qty: 30 TABLET | Refills: 0 | Status: SHIPPED | OUTPATIENT
Start: 2023-12-14 | End: 2023-12-18 | Stop reason: SDUPTHER

## 2023-12-15 ENCOUNTER — OFFICE VISIT (OUTPATIENT)
Dept: OBGYN CLINIC | Facility: CLINIC | Age: 63
End: 2023-12-15
Payer: COMMERCIAL

## 2023-12-15 VITALS
WEIGHT: 216 LBS | BODY MASS INDEX: 39.75 KG/M2 | TEMPERATURE: 98.1 F | DIASTOLIC BLOOD PRESSURE: 80 MMHG | HEIGHT: 62 IN | HEART RATE: 77 BPM | SYSTOLIC BLOOD PRESSURE: 125 MMHG

## 2023-12-15 DIAGNOSIS — M25.511 ACUTE PAIN OF RIGHT SHOULDER: ICD-10-CM

## 2023-12-15 DIAGNOSIS — M75.41 IMPINGEMENT SYNDROME OF RIGHT SHOULDER: Primary | ICD-10-CM

## 2023-12-15 DIAGNOSIS — R20.2 PARESTHESIAS IN RIGHT HAND: ICD-10-CM

## 2023-12-15 PROCEDURE — 99213 OFFICE O/P EST LOW 20 MIN: CPT | Performed by: STUDENT IN AN ORGANIZED HEALTH CARE EDUCATION/TRAINING PROGRAM

## 2023-12-18 ENCOUNTER — OFFICE VISIT (OUTPATIENT)
Dept: FAMILY MEDICINE CLINIC | Facility: CLINIC | Age: 63
End: 2023-12-18
Payer: COMMERCIAL

## 2023-12-18 VITALS
TEMPERATURE: 97.7 F | HEIGHT: 62 IN | BODY MASS INDEX: 39.76 KG/M2 | OXYGEN SATURATION: 95 % | HEART RATE: 72 BPM | WEIGHT: 216.05 LBS | SYSTOLIC BLOOD PRESSURE: 120 MMHG | DIASTOLIC BLOOD PRESSURE: 60 MMHG

## 2023-12-18 DIAGNOSIS — I10 ESSENTIAL HYPERTENSION: ICD-10-CM

## 2023-12-18 DIAGNOSIS — J98.4 PNEUMONITIS: Primary | ICD-10-CM

## 2023-12-18 DIAGNOSIS — M25.811 IMPINGEMENT OF RIGHT SHOULDER: ICD-10-CM

## 2023-12-18 DIAGNOSIS — I10 PRIMARY HYPERTENSION: ICD-10-CM

## 2023-12-18 DIAGNOSIS — J45.20 MILD INTERMITTENT ASTHMA WITHOUT COMPLICATION: ICD-10-CM

## 2023-12-18 DIAGNOSIS — E78.2 MIXED HYPERLIPIDEMIA: ICD-10-CM

## 2023-12-18 PROCEDURE — 99214 OFFICE O/P EST MOD 30 MIN: CPT | Performed by: PHYSICIAN ASSISTANT

## 2023-12-18 RX ORDER — LOSARTAN POTASSIUM 50 MG/1
50 TABLET ORAL DAILY
Qty: 90 TABLET | Refills: 3 | Status: SHIPPED | OUTPATIENT
Start: 2023-12-18 | End: 2024-12-12

## 2023-12-18 RX ORDER — METOPROLOL SUCCINATE 25 MG/1
25 TABLET, EXTENDED RELEASE ORAL 2 TIMES DAILY
Qty: 60 TABLET | Refills: 3 | Status: SHIPPED | OUTPATIENT
Start: 2023-12-18 | End: 2024-04-16

## 2023-12-18 RX ORDER — DOXYCYCLINE HYCLATE 100 MG/1
100 CAPSULE ORAL EVERY 12 HOURS SCHEDULED
Qty: 14 CAPSULE | Refills: 0 | Status: SHIPPED | OUTPATIENT
Start: 2023-12-18 | End: 2023-12-25

## 2023-12-18 RX ORDER — ATORVASTATIN CALCIUM 40 MG/1
40 TABLET, FILM COATED ORAL EVERY EVENING
Qty: 90 TABLET | Refills: 3 | Status: SHIPPED | OUTPATIENT
Start: 2023-12-18 | End: 2024-12-12

## 2023-12-18 NOTE — PROGRESS NOTES
Assessment/Plan:       1. Pneumonitis  -     doxycycline hyclate (VIBRAMYCIN) 100 mg capsule; Take 1 capsule (100 mg total) by mouth every 12 (twelve) hours for 7 days    2. Primary hypertension  -     metoprolol succinate (TOPROL-XL) 25 mg 24 hr tablet; Take 1 tablet (25 mg total) by mouth 2 (two) times a day    3. Essential hypertension  -     losartan (COZAAR) 50 mg tablet; Take 1 tablet (50 mg total) by mouth daily    4. Mixed hyperlipidemia  -     atorvastatin (LIPITOR) 40 mg tablet; Take 1 tablet (40 mg total) by mouth every evening    5. Mild intermittent asthma without complication    6. Impingement of right shoulder     63-year-old female sees me for her primary care services.  Patient had significant pneumonia with a prolonged recovery time earlier this calendar year.  Patient was babysitting her granddaughter who was sick with a fever of 105 and upper respiratory symptoms.  5 days ago, the patient developed her own upper respiratory tract infection with a low-grade fever, coughing, producing greenish sputum thick and hard to expectorate.  She also had fullness in her sinuses.  She could hear wheezing and she had tightness in her chest.  Coughing is worse when laying down.  She also had a headache since she has been having the symptoms.    Patient was encouraged to get the RSV vaccine after she recovers from this illness due to her having exposure to 2-1/2-year-old on a regular basis.    Patient had an injection in the posterior right shoulder scapula by Dr. Muniz due to impingement of the right shoulder.  She is feeling much improved.    Patient works at a [employer] and with her coughing, she was concerned about handling food.  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    Patient is not having any nausea vomiting or diarrhea.  She is able to taste food.  She has not been as hungry as recently.    A total of 32 minutes was spent rendering care for this patient.  This  "time included review of the patient's electronic medical record, performing the history and physical, reviewing appropriate labs and/or images, developing a treatment and assessment plan, answering patient's questions and concerns, and documenting the patient visit.  I will see the patient on February 12 for her annual exam.    Subjective:      Patient ID: Akiko Kang is a 63 y.o. female.    HPI: History as above.  Her coughing gets worse when laying down.  She has not been taking anything for her symptoms to date.  She was not able to work earlier this week because of her ongoing symptoms.    Chest is sore from coughing but no actual pain related to heart pain.  No palpitations dizziness or lightheadedness.    The following portions of the patient's history were reviewed and updated as appropriate: allergies, current medications, past family history, past medical history, past social history, past surgical history, and problem list.    Review of Systems no GI symptoms.  She is exhausted but is probably because she is unable to sleep.    Objective:      /60 (BP Location: Right arm, Patient Position: Sitting, Cuff Size: Standard)   Pulse 72   Temp 97.7 °F (36.5 °C) (Tympanic)   Ht 5' 2\" (1.575 m)   Wt 98 kg (216 lb 0.8 oz)   SpO2 95%   BMI 39.52 kg/m²          Physical Exam well-developed ill-appearing 63-year-old female who is alert oriented and cooperative with exam.  She is appropriate and answering questions.  Vital signs reviewed.  She is normotensive and currently afebrile.    TMs are clear.  No tenderness on pressure of the pinna.  Conjunctiva without inflammation.  No sinus tenderness on palpation.  Throat is very red with a lot of postnasal drainage.  Neck is supple without adenopathy.    Heart is regular rate without murmur rub or gallops.  Lungs revealed egophony changes in the right midlung zone area.  She has some very faint end expiratory wheezes in the same area.  She is otherwise aerating " bases without using accessory muscles of respiration.

## 2023-12-18 NOTE — LETTER
December 18, 2023     Patient: Akiko Kang  YOB: 1960  Date of Visit: 12/18/2023      To Whom it May Concern:    Akiko Kang is under my professional care. Akiko was seen in my office on 12/18/2023. Akiko may return to work on December 26, 2023 .    If you have any questions or concerns, please don't hesitate to call.         Sincerely,          Etta Garcia, PA-C          CC: No Recipients

## 2023-12-18 NOTE — PATIENT INSTRUCTIONS
The patient is coughing, she needs to take Robitussin dextromethorphan or DM or Mucinex DM with dextromethorphan.  This is a cough suppressant and she will be able to get some rest.  Follow the instructions on the label it typically is 1 to 2 teaspoons every 6 hours.  I would suggest the 2 teaspoons before bed to help allow her to get some rest.    She is going to be on doxycycline for 1 week.  I would encourage her to have some yogurt with active cultures or a probiotic while she is on this.  She needs to complete the 7-day course of 1 pill twice a day even if she is feeling improved.    After she recovers from this illness, I would highly recommend an RSV vaccine being that she has connections with a granddaughter who is 2-1/2.

## 2023-12-18 NOTE — PROGRESS NOTES
"1. Impingement syndrome of right shoulder        2. Paresthesias in right hand        3. Acute pain of right shoulder          No orders of the defined types were placed in this encounter.       Imaging Studies (I personally reviewed images in PACS and report):    X-ray cervical spine 11/24/2023: No acute osseous abnormalities.  Mild multilevel degenerative changes of the cervical spine.  X-ray right shoulder 5/2/2022: [facility].  There are no images available but there is a report noting \"Again noted is prominent acromioclavicular joint osteoarthritis, which could contribute to impingement. \"      IMPRESSION:  Subacute neck,right shoulder pain -suspected impingement syndrome versus AC joint arthritis  Clinically improved/resolved since subacromial cortisone injection from last visit  Separate issue of right upper extremity paresthesias in a nondermatomal distribution along the tips of her fingers.  Previously thought to be carpal tunnel syndrome given reexamination today she has not exacerbated by the typical physical exam tests. Possibly radicular source?    Other factors:  NSAIDs c/I given use of Eliquis    PLAN:    Clinical exam and radiographic imaging reviewed with patient today, with impression as per above. I have discussed with the patient the pathophysiology of this diagnosis and reviewed how the examination correlates with this diagnosis.    Reassured patient of her progressive improvement since the injection of her right shoulder.  Encouraged continued range of motion movements of her shoulder to prevent recurrence of this pain in the future.  In regards to the tingling sensations over the tips of her right hand, I am less suspicious for carpal tunnel syndrome given I am unable to recreate carpal tunnel symptomatology with testing as per below.  I counseled we could consider obtaining an EMG of her upper extremities to determine the source will be continue watching and waiting for now to see if symptoms " "progressively resolve.  Patient prefers to watch and wait for now going forward.    Return in about 3 months (around 3/15/2024).    Portions of the record may have been created with voice recognition software. Occasional wrong word or \"sound a like\" substitutions may have occurred due to the inherent limitations of voice recognition software. Read the chart carefully and recognize, using context, where substitutions have occurred.     CHIEF COMPLAINT:  Chief Complaint   Patient presents with    Right Shoulder - Follow-up    Follow-up         HPI:  Akiko Kang is a 63 y.o. female  who presents in regards to    Visit 12/15/2023:  Follow-up evaluation of right shoulder pain:  Patient is status post subacromial cortisone injection from last visit  Patient reports experiencing significant improvement of her shoulder and arm pain since the injection.  She also reports improvements in regards to her overall range of motion and function of her right shoulder again.  She states the only lingering issue is tingling sensations over the tips of her fingers.  She states these are present most days and has been ongoing for the past couple months.  She states there is no tingling sensations on her contralateral side.  She states she has never had symptoms like this in the past.  Despite the tingling sensation along the tips of her fingers, she feels that the strength in her right hand is intact and denies inadvertently dropping objects.  She is unsure whether her temperature affects the tingling sensations of the tips of her fingers.    Visit 11/24/2023:  Initial evaluation of right shoulder pain:  Of note patient concurrently also complains of right paracervical neck pain that radiates down her right upper extremity into her hand  She reports paresthesias of her hands as well in a nondermatomal distribution  Ongoing issue for the past 2 months without a precipitating injury  Pain is aggravated with any range of motion movements " "of her shoulder as well as her neck.  Unable to describe the pain other than that it is constant and of moderate to severe intensity.  Pain can also be interfere with her sleep at night as it is aggravated when lying on her right lateral side.  She reports paresthesias of her hands can occur at any point in the day.  Despite the symptoms, denies right upper extremity swelling, discoloration.  Denies crepitus of right shoulder.  Denies surgeries of her neck or right shoulder in the past.  She has prior imaging of her shoulder back in May 2023.  Denies relief from acetaminophen and NSAIDs.  Patient is aware that she is not supposed to take NSAIDs given she is on Eliquis.        Medical, Surgical, Family, and Social History    Past Medical History:   Diagnosis Date    Allergic     Arthritis     COPD (chronic obstructive pulmonary disease) (HCC)     Coronary artery disease     Diverticulitis of colon     Headache(784.0)     Shingles     Sleep apnea      Past Surgical History:   Procedure Laterality Date    CARDIAC SURGERY  2023     SECTION  ,     KNEE SURGERY       Social History   Social History     Substance and Sexual Activity   Alcohol Use Yes    Comment: occassionally     Social History     Substance and Sexual Activity   Drug Use Never     Social History     Tobacco Use   Smoking Status Former    Current packs/day: 0.00    Types: Cigarettes    Quit date: 2019    Years since quittin.9   Smokeless Tobacco Never     Family History   Problem Relation Age of Onset    Heart attack Father 57     Allergies   Allergen Reactions    Ace Inhibitors Cough          Physical Exam  /80   Pulse 77   Temp 98.1 °F (36.7 °C)   Ht 5' 2\" (1.575 m)   Wt 98 kg (216 lb)   BMI 39.51 kg/m²     Constitutional:  see vital signs  Gen: Obese, normocephalic/atraumatic, well-groomed  Pulmonary/Chest: Effort normal. No respiratory distress.       Right Hand Exam     Tenderness   The patient is experiencing no " tenderness.     Range of Motion   Wrist   Extension:  40   Flexion:  60   Pronation:  90   Supination:  90     Muscle Strength   Wrist extension: 5/5   Wrist flexion: 5/5   : 5/5     Tests   Phalen’s Sign: negative  Tinel's sign (median nerve): negative  Finkelstein's test: negative    Other   Erythema: absent    Comments:  Carpal compression test: Negative.  Patient states that there is tingling sensations of her fingertips but does not change with carpal compression test      Left Hand Exam     Comments:  Carpal compression test: Negative          Shoulder exam:       RIGHT    Inspection Erythema None     Swelling None     Increased Warmth None    Rotator cuff ER 5/5     IR 5/5     Abduction 5/5    ROM      Abduction 130     ER0 60     IRb T10    TTP:  + Mildly over the right paracervical aspect of her neck but tolerable    Special Tests:       Cross body Adduction Negative     Speeds  Negative     Yergason's Negative     Drop arm Negative     Neer Negative     Patton Negative                    Procedures

## 2023-12-31 DIAGNOSIS — I51.3 MURAL THROMBUS OF CARDIAC APEX: ICD-10-CM

## 2023-12-31 DIAGNOSIS — I48.0 PAROXYSMAL ATRIAL FIBRILLATION (HCC): ICD-10-CM

## 2024-01-02 RX ORDER — APIXABAN 5 MG/1
5 TABLET, FILM COATED ORAL 2 TIMES DAILY
Qty: 60 TABLET | Refills: 3 | Status: SHIPPED | OUTPATIENT
Start: 2024-01-02

## 2024-01-05 ENCOUNTER — VBI (OUTPATIENT)
Dept: ADMINISTRATIVE | Facility: OTHER | Age: 64
End: 2024-01-05

## 2024-01-05 NOTE — TELEPHONE ENCOUNTER
01/05/24 1:49 PM     [employer] SmartForm used to document caregap status.    Jacoby Velazquez MA

## 2024-02-06 ENCOUNTER — OFFICE VISIT (OUTPATIENT)
Dept: FAMILY MEDICINE CLINIC | Facility: CLINIC | Age: 64
End: 2024-02-06
Payer: COMMERCIAL

## 2024-02-06 VITALS
OXYGEN SATURATION: 97 % | HEIGHT: 62 IN | BODY MASS INDEX: 40.16 KG/M2 | WEIGHT: 218.26 LBS | DIASTOLIC BLOOD PRESSURE: 63 MMHG | SYSTOLIC BLOOD PRESSURE: 135 MMHG | HEART RATE: 77 BPM

## 2024-02-06 DIAGNOSIS — R19.5 CHANGE IN CONSISTENCY OF STOOL: ICD-10-CM

## 2024-02-06 DIAGNOSIS — R15.2 FECAL URGENCY: ICD-10-CM

## 2024-02-06 DIAGNOSIS — A08.4 GASTROENTERITIS AND COLITIS, VIRAL: Primary | ICD-10-CM

## 2024-02-06 PROCEDURE — 99213 OFFICE O/P EST LOW 20 MIN: CPT | Performed by: PHYSICIAN ASSISTANT

## 2024-02-06 RX ORDER — BISACODYL 5 MG/1
262 TABLET, COATED ORAL 2 TIMES DAILY
Qty: 30 TABLET | Refills: 1 | Status: SHIPPED | OUTPATIENT
Start: 2024-02-06

## 2024-02-06 NOTE — LETTER
February 6, 2024     Patient: Akiko Kang  YOB: 1960  Date of Visit: 2/6/2024      To Whom it May Concern:    Akiko Kang is under my professional care. Akiko was seen in my office on 2/6/2024. Akiko may return to work on February 10, 2024 .    If you have any questions or concerns, please don't hesitate to call.         Sincerely,      Etta Garcia, PA-C          CC: No Recipients

## 2024-02-06 NOTE — PROGRESS NOTES
Assessment/Plan:       1. Gastroenteritis and colitis, viral  -     Bismuth Subsalicylate (Kaopectate) 262 MG TABS; Take 1 tablet (262 mg total) by mouth 2 (two) times a day    2. Fecal urgency  -     Bismuth Subsalicylate (Kaopectate) 262 MG TABS; Take 1 tablet (262 mg total) by mouth 2 (two) times a day    3. Change in consistency of stool  -     Bismuth Subsalicylate (Kaopectate) 262 MG TABS; Take 1 tablet (262 mg total) by mouth 2 (two) times a day     This 63-year-old female sees me for primary care services.  Patient has had a dramatic change in her stool pattern over the past 3 days.  Patient has abundant soft slimy stool for every bowel movement.  She has a sense of fecal urgency and must get to the bathroom immediately.  She is also having nocturnal fecal urgency which is also a new problem for her.  Patient is hungry but she is afraid of eating in case the food would cause more feces to be produced.  She states that sauces and gravy seem to bring more fecal urgency and stooling.  She is not having any fever or chills.  No nausea or vomiting.  Has not been around anyone who has similar symptoms.    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  With her having these fecal changes, it is important that she not handle food and I am keeping her off work until February 10.  A work excuse has been written for her.    Patient did try Imodium but was ineffective for her.  She is not truly having diarrhea as the stool that she is forming is formed and not water.  She does have some blood dripping into the toilet which is a new symptom for her.  I offered to do an examination to assess for either a fissure or hemorrhoid but the patient declined saying that she does not think that she could hold her fecal material if I would be doing the exam.  She asked if I would defer this and I said that I would be able to.    Patient has no rash on her body.  She does not have any melena or hematochezia.  She is voiding without  "difficulty.  She said that she had this problem several years ago but it resolved within 1 or 2 days.  She states that this type of episode is much different than her irritable bowel syndrome and I am in full agreement because irritable bowel syndrome does not cause nocturnal symptoms.    A total of 25 minutes was spent rendering care for this patient.  This time included review of the patient's electronic medical record, performing the history and physical, reviewing appropriate labs and/or images, developing a treatment and assessment plan, answering patient's questions and concerns, and documenting the patient visit.  I will see the patient on February 12 for her regular follow-up appointment.    Subjective:      Patient ID: Akiko Knag is a 63 y.o. female.    HPI: History as above.  She has been limiting her food intake for fear that this would worsen her frequent soft stools.  I did suggest to her to avoid milk products as I believe that she has a viral gastroenteritis and this can make a person lactose intolerant until recovery.    No fever chills sweats.  No nausea dry heaves or vomiting.  Has not had any sick contacts to her knowledge.    The following portions of the patient's history were reviewed and updated as appropriate: allergies, current medications, past family history, past medical history, past social history, past surgical history, and problem list.    Review of Systems no recent URI symptoms.  No joint complaints.    Objective:      /63 (BP Location: Right arm, Patient Position: Sitting, Cuff Size: Standard)   Pulse 77   Ht 5' 2\" (1.575 m)   Wt 99 kg (218 lb 4.1 oz)   SpO2 97%   BMI 39.92 kg/m²          Physical Exam patient is nontoxic in appearance.  She is normotensive.    Patient's heart is regular rate without murmur rub or gallops.  Lungs are clear to auscultation.  Abdomen is round soft positive bowel sounds without masses or tenderness.  "

## 2024-02-09 ENCOUNTER — TELEPHONE (OUTPATIENT)
Dept: FAMILY MEDICINE CLINIC | Facility: CLINIC | Age: 64
End: 2024-02-09

## 2024-02-09 NOTE — TELEPHONE ENCOUNTER
Called the patient to confirm her appointment for Monday 20/12/2024. No answer/ mail box was full unable to leave a voicemail.

## 2024-02-15 ENCOUNTER — OFFICE VISIT (OUTPATIENT)
Dept: FAMILY MEDICINE CLINIC | Facility: CLINIC | Age: 64
End: 2024-02-15
Payer: COMMERCIAL

## 2024-02-15 VITALS
BODY MASS INDEX: 40.77 KG/M2 | HEART RATE: 83 BPM | DIASTOLIC BLOOD PRESSURE: 73 MMHG | SYSTOLIC BLOOD PRESSURE: 136 MMHG | TEMPERATURE: 98.7 F | OXYGEN SATURATION: 97 % | WEIGHT: 221.56 LBS | HEIGHT: 62 IN

## 2024-02-15 DIAGNOSIS — I10 ESSENTIAL HYPERTENSION: ICD-10-CM

## 2024-02-15 DIAGNOSIS — R15.2 FECAL URGENCY: ICD-10-CM

## 2024-02-15 DIAGNOSIS — M19.011 ARTHRITIS OF RIGHT ACROMIOCLAVICULAR JOINT: ICD-10-CM

## 2024-02-15 DIAGNOSIS — F43.9 STRESS AT HOME: ICD-10-CM

## 2024-02-15 DIAGNOSIS — Z12.11 SCREENING FOR COLORECTAL CANCER: ICD-10-CM

## 2024-02-15 DIAGNOSIS — F41.1 GAD (GENERALIZED ANXIETY DISORDER): ICD-10-CM

## 2024-02-15 DIAGNOSIS — Z12.31 ENCOUNTER FOR SCREENING MAMMOGRAM FOR BREAST CANCER: ICD-10-CM

## 2024-02-15 DIAGNOSIS — Z12.12 SCREENING FOR COLORECTAL CANCER: ICD-10-CM

## 2024-02-15 DIAGNOSIS — M62.830 MUSCLE SPASM OF BACK: Primary | ICD-10-CM

## 2024-02-15 PROCEDURE — 99214 OFFICE O/P EST MOD 30 MIN: CPT | Performed by: PHYSICIAN ASSISTANT

## 2024-02-15 NOTE — PROGRESS NOTES
Assessment/Plan:       1. Muscle spasm of back    2. Encounter for screening mammogram for breast cancer  -     Mammo screening bilateral w 3d & cad; Future; Expected date: 02/22/2024    3. Screening for colorectal cancer  -     [place]    4. VERONICA (generalized anxiety disorder)    5. Essential hypertension    6. Stress at home    7. Arthritis of right acromioclavicular joint    8. Fecal urgency     This 63-year-old female sees me for her primary care services.  I recently saw the patient about a week ago for recurrent diarrhea.  She was placed on Kaopectate which worked and actually worked too well causing her to have some constipation.  She stopped the Kaopectate.  She no longer has diarrhea but is having some fecal urgency which is not a new problem for her.    Patient is having a lot of stress with regard to her  and her  wanting to give up.  Her  has a history of a brain aneurysm and sees a specialist neurosurgeon at [facility].  The specialist wants him to have a repeat MRI and he was told that this would not be covered by his insurance.  He is very stressed about having to pay for such an expensive test.      Patient is complaining of ongoing right-sided low back pain.  She states that certain movements seem to bring on the pain.  She states that massage and applying blue glue seems to help with this muscle spasm.  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  This can aggravate her back pain.  She states that she has had back spasms in the past.  She feels that the muscle relaxants do not adequately help her and make her extremely tired.  I did show her a stretch for her low back that she can do in the morning and at night while laying in bed.  She was symptomatically improved after doing the stretches today.  She does have a palpable  lumbosacral muscle that is in spasm with a palpable knot felt that today's exam.  She has no flank pain.    She is trying to cope with her 's defeatist attitude.  She feels that the buspirone is making her more tolerant and she has a longer fuse before she gets angry.    Patient has no further pain in her chest heart palpitations dizziness or lightheadedness.  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    Patient previously received a Cologuard but had it .  She asked that another one be sent and that she would send this in.  She also is overdue for mammogram and states that because of bills she does not want to get the mammogram right now however I did give her a referral and told her that the mammogram would be good for 1 year.    A total of 35 minutes was spent rendering care for this patient.  This time included review of the patient's electronic medical record, performing the history and physical, reviewing appropriate labs and/or images, developing a treatment and assessment plan, answering patient's questions and concerns, and documenting the patient visit.  I will see the patient in 1 month to perform her annual exam along with Pap smear as this is overdue.    Subjective:      Patient ID: Akiko Kang is a 63 y.o. female.    HPI: 63-year-old female who sees me for her primary care services her history is as above.  Her primary complaint presently is with muscle spasm in her back.  Moving her bowels and passing her water does not affect the pain.  She has no fever or chills.  She has no radicular pain.  Her stools have normalized and are no longer diarrheal.  She does have soft stools with a lot of fecal urgency.    No pain in her chest heart palpitations dizziness lightheadedness syncope or near syncope.    Patient's anxiety has dramatically increased which is the direct result of her  having more health  "issues and also not being willing to have appropriate follow-up test.  Her VERONICA-7 score has increased from a score of 3 which was mild to minimal anxiety to now a score of 10 which is moderate anxiety.    The patient's right shoulder is once again hurting.  She sees Dr. Griffin Muniz who injected her right shoulder and gave her home exercises.  She has a lot of arthritis at the right AC joint.  She has crepitation with movement of the right shoulder.    The following portions of the patient's history were reviewed and updated as appropriate: allergies, current medications, past family history, past medical history, past social history, past surgical history, and problem list.    Review of Systems no recent URI or viral syndrome.    Objective:      /73 (BP Location: Right arm, Patient Position: Sitting, Cuff Size: Large)   Pulse 83   Temp 98.7 °F (37.1 °C) (Tympanic)   Ht 5' 2\" (1.575 m)   Wt 101 kg (221 lb 9 oz)   SpO2 97%   BMI 40.52 kg/m²          Physical Exam reviewed vital signs.  Blood pressure appropriate at 136/73.  She is afebrile.    Patient's heart is regular rate without murmur rub or gallops.  Lungs are clear to auscultation.  She is aerating the bases well without using accessory muscles.    Low back: To the right of the midline she has palpable muscle spasm of the lumbosacral spine muscles on the right side only.  Simple hip flexion done with patient grabbing a bended knee on each side in order to help to relax those muscles.  This was demonstrated to the patient and she was able to do these exercises with good results.  She states that she cannot afford to go to physical therapy at this time as it cost her $50 per visit.    Patient did have appropriate motion in both shoulders.  When performing shoulder extension, an audible click was heard with patient having pain in the right shoulder area.  "

## 2024-03-13 ENCOUNTER — OFFICE VISIT (OUTPATIENT)
Dept: FAMILY MEDICINE CLINIC | Facility: CLINIC | Age: 64
End: 2024-03-13
Payer: COMMERCIAL

## 2024-03-13 VITALS
WEIGHT: 216.49 LBS | BODY MASS INDEX: 39.84 KG/M2 | DIASTOLIC BLOOD PRESSURE: 60 MMHG | HEART RATE: 73 BPM | HEIGHT: 62 IN | OXYGEN SATURATION: 96 % | SYSTOLIC BLOOD PRESSURE: 132 MMHG | TEMPERATURE: 99 F

## 2024-03-13 DIAGNOSIS — J06.9 ACUTE URI: Primary | ICD-10-CM

## 2024-03-13 PROCEDURE — 99213 OFFICE O/P EST LOW 20 MIN: CPT | Performed by: PHYSICIAN ASSISTANT

## 2024-03-13 NOTE — LETTER
March 13, 2024     Patient: Akiko Kang  YOB: 1960  Date of Visit: 3/13/2024      To Whom it May Concern:    Akiko Kang is under my professional care. Akiko was seen in my office on 3/13/2024. Akiko may return to work on March 14, 2024 .    If you have any questions or concerns, please don't hesitate to call.         Sincerely,          Renee Talamantes PA-C        CC: No Recipients

## 2024-03-13 NOTE — PROGRESS NOTES
Assessment/Plan:       1. Acute URI     This 63-year-old female sees me for her primary care services.  Patient developed a cough and postnasal drainage x 1 day.  She is also fatigued.  She was not able to go to work today due to the symptoms.  She is not having any fever or chills.  She denies nausea or vomiting.  She wanted to make sure that she was not potentially infectious and she is to have her grandchildren visiting this weekend.    Patient denies headache or myalgias.  No sputum production.  She has had pneumonia in the past and wanted to be checked in order to provide treatment as needed.    A total of 20 minutes was spent rendering care for this patient.  This time included review of the patient's electronic medical record, performing the history and physical, reviewing appropriate labs and/or images, developing a treatment and assessment plan, answering patient's questions and concerns, and documenting the patient visit.  I will see the patient on March 28 for her annual physical exam.  She did request and received a work note for absence for today.  She should be able to return to work tomorrow.    Subjective:      Patient ID: Akiko Kang is a 63 y.o. female.    HPI: 63-year-old pleasant female who has a nontoxic appearance.  She is not having any ear pain.  She is having a lot of postnasal drainage.  Only has a very minor cough and no sputum production.  No pain in her chest.  No dizziness lightheadedness syncope or near syncope.    No GI symptoms at this time.    The following portions of the patient's history were reviewed and updated as appropriate: allergies, current medications, past family history, past medical history, past social history, past surgical history, and problem list.    Review of Systems patient has been doing fairly well since her ablation therapy.  No further tachycardic symptoms.    Objective:      /60 (BP Location: Right arm, Patient Position: Sitting, Cuff Size: Standard)   " Pulse 73   Temp 99 °F (37.2 °C) (Tympanic)   Ht 5' 2\" (1.575 m)   Wt 98.2 kg (216 lb 7.9 oz)   SpO2 96%   BMI 39.60 kg/m²          Physical Exam well-developed well-nourished nontoxic-appearing 63-year-old female who is alert oriented and cooperative with exam.  She has a low-grade fever at 99.  Blood pressure 132/60.    TMs are clear.  No sinus pressure or pain on palpation.  Throat showed some postnasal drainage with some irritation on the right tonsil bed.  No exudate or tonsillar hypertrophy was noted.  No nuchal adenopathy or tenderness.    Heart is regular rate without murmur rub or gallops.  Lungs are clear to auscultation.  "

## 2024-03-15 ENCOUNTER — OFFICE VISIT (OUTPATIENT)
Dept: OBGYN CLINIC | Facility: CLINIC | Age: 64
End: 2024-03-15
Payer: COMMERCIAL

## 2024-03-15 ENCOUNTER — HOSPITAL ENCOUNTER (OUTPATIENT)
Dept: RADIOLOGY | Facility: CLINIC | Age: 64
End: 2024-03-15
Payer: COMMERCIAL

## 2024-03-15 VITALS
BODY MASS INDEX: 39.75 KG/M2 | HEIGHT: 62 IN | HEART RATE: 75 BPM | SYSTOLIC BLOOD PRESSURE: 130 MMHG | WEIGHT: 216 LBS | DIASTOLIC BLOOD PRESSURE: 80 MMHG | TEMPERATURE: 98.2 F

## 2024-03-15 DIAGNOSIS — M25.511 ACUTE PAIN OF RIGHT SHOULDER: Primary | ICD-10-CM

## 2024-03-15 DIAGNOSIS — M19.011 ARTHRITIS OF RIGHT ACROMIOCLAVICULAR JOINT: ICD-10-CM

## 2024-03-15 DIAGNOSIS — M25.511 ACUTE PAIN OF RIGHT SHOULDER: ICD-10-CM

## 2024-03-15 DIAGNOSIS — M75.41 IMPINGEMENT SYNDROME OF RIGHT SHOULDER: ICD-10-CM

## 2024-03-15 PROCEDURE — 73030 X-RAY EXAM OF SHOULDER: CPT

## 2024-03-15 PROCEDURE — 99213 OFFICE O/P EST LOW 20 MIN: CPT | Performed by: STUDENT IN AN ORGANIZED HEALTH CARE EDUCATION/TRAINING PROGRAM

## 2024-03-15 PROCEDURE — 20610 DRAIN/INJ JOINT/BURSA W/O US: CPT | Performed by: STUDENT IN AN ORGANIZED HEALTH CARE EDUCATION/TRAINING PROGRAM

## 2024-03-15 RX ORDER — TRIAMCINOLONE ACETONIDE 40 MG/ML
40 INJECTION, SUSPENSION INTRA-ARTICULAR; INTRAMUSCULAR
Status: COMPLETED | OUTPATIENT
Start: 2024-03-15 | End: 2024-03-15

## 2024-03-15 RX ORDER — BUPIVACAINE HYDROCHLORIDE 2.5 MG/ML
2 INJECTION, SOLUTION INFILTRATION; PERINEURAL
Status: COMPLETED | OUTPATIENT
Start: 2024-03-15 | End: 2024-03-15

## 2024-03-15 RX ADMIN — TRIAMCINOLONE ACETONIDE 40 MG: 40 INJECTION, SUSPENSION INTRA-ARTICULAR; INTRAMUSCULAR at 10:15

## 2024-03-15 RX ADMIN — BUPIVACAINE HYDROCHLORIDE 2 ML: 2.5 INJECTION, SOLUTION INFILTRATION; PERINEURAL at 10:15

## 2024-03-15 NOTE — PROGRESS NOTES
1. Acute pain of right shoulder  XR shoulder 2+ vw right    Large joint arthrocentesis: R subacromial bursa      2. Impingement syndrome of right shoulder  XR shoulder 2+ vw right    Large joint arthrocentesis: R subacromial bursa      3. Arthritis of right acromioclavicular joint  XR shoulder 2+ vw right        Orders Placed This Encounter   Procedures    Large joint arthrocentesis: R subacromial bursa    XR shoulder 2+ vw right        Imaging Studies (I personally reviewed images in PACS and report):    X-ray right shoulder 3/15/2024: No acute osseous abnormalities.  Severe/prominent osteoarthritis of the AC joint with superior and inferior spurring.      IMPRESSION:  Acute on chronic atraumatic right shoulder pain/crepitus secondary to impingement syndrome either from tendinosis versus impingement from the prominent spurring of her severe osteoarthritic AC joint.  However clinically patient has only minimal tenderness over the AC joint on exam and negative crossarm test.  Previously had significant improvement with subacromial cortisone injection in the past back in November 2023    Other factors:  Right-hand-dominant and works in an occupation at the [employer] where she does repetitive range of motion movement of her RUE    PLAN:    Clinical exam and radiographic imaging reviewed with patient today, with impression as per above. I have discussed with the patient the pathophysiology of this diagnosis and reviewed how the examination correlates with this diagnosis.    Imaging obtained of her right shoulder today as noted above. I will follow up offical radiology interpretation.  Treatment options were discussed at length, including risks and benefits; after discussing these treatment options, the patient elected for subacromial CSI as per procedure note below. Counseled there could be consideration for surgical intervention if conservative treatments fail, given the prominence of spurring of her AC joint which could  "cause impingement like symptoms as well.  Separately, patient also wished to address the right knee today but due to time constraints I recommended we make a follow-up in 2 weeks to discuss further as I will likely need to obtain imaging given her reported history of prior surgery in her knee in the past.    Return in about 2 weeks (around 3/29/2024) for Follow up separately to discuss right knee pain.    Portions of the record may have been created with voice recognition software. Occasional wrong word or \"sound a like\" substitutions may have occurred due to the inherent limitations of voice recognition software. Read the chart carefully and recognize, using context, where substitutions have occurred.     CHIEF COMPLAINT:  Chief Complaint   Patient presents with    Right Shoulder - Follow-up    Follow-up         HPI:  Akiko Kang is a 63 y.o. female  who presents for       Visit 3/15/2024 :  Follow up evaluation of right shoulder pain:  Patient has been seen in the past in regards to impingement syndrome of her right shoulder which she underwent a subacromial cortisone injection with significant relief  She reports today that she has been doing well until about 2 or 3 weeks ago.  Denies any new injury but does attribute her occupation in a [employer] where she is repetitively using her arms is a contributing factor.  She feels that the pain is similar to her impingement pain in the past and that it is over the dorsal posterior aspect of her shoulder.  Describes it as an aching pain of mild to moderate intensity that worsens with repetitive use of her right upper extremity.  Reports crepitus with range of motion movements and slightly decreased range of motion.  Reports her strength is intact despite the pain.  Denies shoulder swelling, discoloration.  She is interested in whether she can have a repeat subacromial cortisone injection today.    Furthermore, patient also had other complaints today of right knee pain.  She " "states she has a history of a right meniscus tear status post surgery from years ago.  She also states undergoing an injection of her knee with cortisone with relief with this about 2 years ago.  Due to time constraints, I recommended we make a follow-up visit within the next 1 to 2 weeks to discuss this issue while we manage her right shoulder pain.      Medical, Surgical, Family, and Social History    Past Medical History:   Diagnosis Date    Allergic     Arthritis     COPD (chronic obstructive pulmonary disease) (HCC)     Coronary artery disease     Diverticulitis of colon     Headache(784.0)     Shingles     Sleep apnea      Past Surgical History:   Procedure Laterality Date    CARDIAC SURGERY  2023     SECTION  ,     KNEE SURGERY       Social History   Social History     Substance and Sexual Activity   Alcohol Use Yes    Comment: occassionally     Social History     Substance and Sexual Activity   Drug Use Never     Social History     Tobacco Use   Smoking Status Former    Current packs/day: 0.00    Types: Cigarettes    Quit date: 2019    Years since quittin.2   Smokeless Tobacco Never     Family History   Problem Relation Age of Onset    Diverticulitis Mother     Heart attack Father 57     Allergies   Allergen Reactions    Ace Inhibitors Cough          Physical Exam  /80   Pulse 75   Temp 98.2 °F (36.8 °C)   Ht 5' 2\" (1.575 m)   Wt 98 kg (216 lb)   BMI 39.51 kg/m²     Constitutional:  see vital signs  Gen: obese, normocephalic/atraumatic, well-groomed  Eyes: No inflammation or discharge of conjunctiva or lids; sclera clear   Pharynx: no inflammation, lesion, or mass of lips  Pulmonary/Chest: Effort normal. No respiratory distress.     Ortho Exam  Shoulder exam:       RIGHT    Inspection Erythema None     Swelling None     Increased Warmth None    Rotator cuff ER 5/5     IR 5/5     Abduction 5/5    ROM      Abduction 170     ER0 60     IRb T7    TTP:  +posterolateral " aspect over supraspinatus, greater tuberosity/subacromial. Reports only minimal discomfort over AC joint    Special Tests: O'Briens  (FF 90, add 10, resist thumbs up-, resist thumbs down+) Negative slap    Cross body Adduction Negative     Speeds  Negative     Yergason's Negative     Drop arm Negative     Neer Positive     Patton Positive            Large joint arthrocentesis: R subacromial bursa  Universal Protocol:  Consent: Verbal consent obtained.  Risks and benefits: risks, benefits and alternatives were discussed  Consent given by: patient  Patient understanding: patient states understanding of the procedure being performed  Site marked: the operative site was marked  Radiology Images displayed and confirmed. If images not available, report reviewed: imaging studies available  Patient identity confirmed: verbally with patient  Supporting Documentation  Indications: pain   Procedure Details  Location: shoulder - R subacromial bursa  Preparation: Patient was prepped and draped in the usual sterile fashion  Needle size: 22 G  Ultrasound guidance: no  Approach: posterior  Medications administered: 40 mg triamcinolone acetonide 40 mg/mL; 2 mL bupivacaine 0.25 %    Patient tolerance: patient tolerated the procedure well with no immediate complications  Dressing:  Sterile dressing applied

## 2024-03-27 ENCOUNTER — HOSPITAL ENCOUNTER (EMERGENCY)
Facility: HOSPITAL | Age: 64
Discharge: HOME | End: 2024-03-27
Attending: EMERGENCY MEDICINE
Payer: COMMERCIAL

## 2024-03-27 VITALS
HEART RATE: 61 BPM | OXYGEN SATURATION: 97 % | TEMPERATURE: 97.3 F | DIASTOLIC BLOOD PRESSURE: 68 MMHG | SYSTOLIC BLOOD PRESSURE: 168 MMHG | RESPIRATION RATE: 18 BRPM

## 2024-03-27 DIAGNOSIS — S00.412A ABRASION OF LEFT EAR CANAL, INITIAL ENCOUNTER: Primary | ICD-10-CM

## 2024-03-27 PROCEDURE — 99284 EMERGENCY DEPT VISIT MOD MDM: CPT | Performed by: PHYSICIAN ASSISTANT

## 2024-03-27 PROCEDURE — 99282 EMERGENCY DEPT VISIT SF MDM: CPT

## 2024-03-27 RX ORDER — AMOXICILLIN AND CLAVULANATE POTASSIUM 875; 125 MG/1; MG/1
1 TABLET, FILM COATED ORAL EVERY 12 HOURS
Qty: 14 TABLET | Refills: 0 | Status: SHIPPED | OUTPATIENT
Start: 2024-03-27 | End: 2024-04-03

## 2024-03-27 RX ORDER — NEOMYCIN SULFATE, POLYMYXIN B SULFATE AND HYDROCORTISONE 10; 3.5; 1 MG/ML; MG/ML; [USP'U]/ML
4 SUSPENSION/ DROPS AURICULAR (OTIC) EVERY 6 HOURS SCHEDULED
Qty: 10 ML | Refills: 0 | Status: SHIPPED | OUTPATIENT
Start: 2024-03-27

## 2024-03-27 NOTE — ED PROVIDER NOTES
History  Chief Complaint   Patient presents with    Ear Drainage     Patient c/o left ear has bloody drainage. Patient denies pain or injury     The patient is a 63-year-old female presents amilcar with a chief complaint of left ear canal bleeding.  Patient states that last night when she laid down she noticed to have a trickling of blood out of her left ear canal.  She applied a cotton ball.  She changes several times.  Came in for evaluation today.  The patient states that she does use Q-tips daily.  She did not have any pain, fevers chills nasal congestion or popping sensation.  The patient states that she is on Eliquis and aspirin daily        Ear Drainage  Location:  Bloody left ear canal  Duration:  1 day  Timing:  Constant      Prior to Admission Medications   Prescriptions Last Dose Informant Patient Reported? Taking?   Eliquis 5 MG   No No   Sig: take 1 tablet by mouth twice a day   Multiple Vitamin (One-Daily Multi-Vitamin) TABS   Yes No   Sig: Take by mouth   Specialty Vitamins Products (Advanced Collagen) TABS   Yes No   Sig: Take by mouth   albuterol (PROVENTIL HFA,VENTOLIN HFA) 90 mcg/act inhaler   No No   Sig: Inhale 2 puffs every 6 (six) hours as needed for wheezing   aspirin 81 mg chewable tablet   Yes No   Sig: Chew 81 mg daily   atorvastatin (LIPITOR) 40 mg tablet   No No   Sig: Take 1 tablet (40 mg total) by mouth every evening   busPIRone (BUSPAR) 7.5 mg tablet   No No   Sig: Take 1 tablet (7.5 mg total) by mouth 2 (two) times a day   furosemide (LASIX) 40 mg tablet   No No   Sig: Take 1 tablet (40 mg total) by mouth 2 (two) times a day   losartan (COZAAR) 50 mg tablet   No No   Sig: Take 1 tablet (50 mg total) by mouth daily   metoprolol succinate (TOPROL-XL) 25 mg 24 hr tablet   No No   Sig: Take 1 tablet (25 mg total) by mouth 2 (two) times a day   potassium chloride (K-DUR,KLOR-CON) 20 mEq tablet   Yes No   Sig: Take 40 mEq by mouth daily      Facility-Administered Medications: None       Past  Medical History:   Diagnosis Date    Allergic     Arthritis     COPD (chronic obstructive pulmonary disease) (HCC)     Coronary artery disease     Diverticulitis of colon     Headache(784.0)     Shingles     Sleep apnea        Past Surgical History:   Procedure Laterality Date    CARDIAC SURGERY  2023     SECTION  ,     KNEE SURGERY         Family History   Problem Relation Age of Onset    Diverticulitis Mother     Heart attack Father 57     I have reviewed and agree with the history as documented.    E-Cigarette/Vaping    E-Cigarette Use Never User      E-Cigarette/Vaping Substances    Nicotine No     THC No     CBD No     Flavoring No     Other No     Unknown No      Social History     Tobacco Use    Smoking status: Former     Current packs/day: 0.00     Types: Cigarettes     Quit date: 2019     Years since quittin.2    Smokeless tobacco: Never   Vaping Use    Vaping status: Never Used   Substance Use Topics    Alcohol use: Yes     Comment: occassionally    Drug use: Never       Review of Systems   All other systems reviewed and are negative.      Physical Exam  Physical Exam  Vitals and nursing note reviewed.   Constitutional:       General: She is not in acute distress.     Appearance: She is well-developed.   HENT:      Head: Normocephalic and atraumatic.      Right Ear: Ear canal and external ear normal.      Left Ear: External ear normal. No hemotympanum. Tympanic membrane is not perforated, erythematous or bulging.      Ears:      Comments: The patient's left ear canal is erythematous, small amount of blood noted in the left ear canal, with curette area is tender to touch.  No tragus tenderness, TM is intact no pain in the left ear no hearing deficit.  Eyes:      Extraocular Movements: Extraocular movements intact.      Pupils: Pupils are equal, round, and reactive to light.   Cardiovascular:      Rate and Rhythm: Normal rate.   Pulmonary:      Effort: Pulmonary effort is normal.    Musculoskeletal:      Cervical back: Normal range of motion and neck supple.   Skin:     General: Skin is warm and dry.      Capillary Refill: Capillary refill takes less than 2 seconds.   Neurological:      General: No focal deficit present.      Mental Status: She is alert and oriented to person, place, and time.      Coordination: Coordination normal.      Gait: Gait is intact.   Psychiatric:         Behavior: Behavior normal.         Vital Signs  ED Triage Vitals [03/27/24 0930]   Temperature Pulse Respirations Blood Pressure SpO2   (!) 97.3 °F (36.3 °C) 61 18 168/68 97 %      Temp Source Heart Rate Source Patient Position - Orthostatic VS BP Location FiO2 (%)   Temporal Monitor Sitting Left arm --      Pain Score       --           Vitals:    03/27/24 0930   BP: 168/68   Pulse: 61   Patient Position - Orthostatic VS: Sitting         Visual Acuity      ED Medications  Medications - No data to display    Diagnostic Studies  Results Reviewed       None                   No orders to display              Procedures  Procedures         ED Course                               SBIRT 22yo+      Flowsheet Row Most Recent Value   Initial Alcohol Screen: US AUDIT-C     1. How often do you have a drink containing alcohol? 1 Filed at: 03/27/2024 0950   2. How many drinks containing alcohol do you have on a typical day you are drinking?  0 Filed at: 03/27/2024 0950   3a. Male UNDER 65: How often do you have five or more drinks on one occasion? 0 Filed at: 03/27/2024 0950   3b. FEMALE Any Age, or MALE 65+: How often do you have 4 or more drinks on one occassion? 0 Filed at: 03/27/2024 0950   Audit-C Score 1 Filed at: 03/27/2024 0950   DONTAE: How many times in the past year have you...    Used an illegal drug or used a prescription medication for non-medical reasons? Never Filed at: 03/27/2024 0950                      Medical Decision Making  The patient is a 63-year-old female presents amilcar with a chief complaint of left  ear canal bleeding.  Patient states that last night when she laid down she noticed to have a trickling of blood out of her left ear canal.  She applied a cotton ball.  She changes several times.  Came in for evaluation today.  The patient states that she does use Q-tips daily.  She did not have any pain, fevers chills nasal congestion or popping sensation.  The patient states that she is on Eliquis and aspirin daily    The patient's left ear canal is erythematous, small amount of blood noted in the left ear canal, with curette area is tender to touch.  No tragus tenderness, TM is intact no pain in the left ear no hearing deficit.    On examination has a condition similar to an abrasion to the left ear canal, bleeding noted in the ear canal but no persistent trickle or hemorrhage.  Patient was given ear wick, placed on antibiotics and eardrops will place referral to ENT for follow-up.  Expressed understanding and agreement treatment plan.             Disposition  Final diagnoses:   Abrasion of left ear canal, initial encounter     Time reflects when diagnosis was documented in both MDM as applicable and the Disposition within this note       Time User Action Codes Description Comment    3/27/2024  9:41 AM Joaquim Jaramillo Add [H60.322] Acute hemorrhagic otitis externa of left ear     3/27/2024  9:51 AM Joaquim Jaramillo Add [S00.412A] Abrasion of left ear canal, initial encounter     3/27/2024  9:51 AM Joaquim Jaramillo Modify [S00.412A] Abrasion of left ear canal, initial encounter     3/27/2024  9:51 AM Joaquim Jaramillo Remove [H60.322] Acute hemorrhagic otitis externa of left ear           ED Disposition       ED Disposition   Discharge    Condition   Stable    Date/Time   Wed Mar 27, 2024 0952    Comment   Akiko Kang discharge to home/self care.                   Follow-up Information       Follow up With Specialties Details Why Contact Info    Suhas Munguia MD Otolaryngology Schedule an appointment as soon as possible for a  visit   [address]  [facility] [id number] [phone]              Patient's Medications   Discharge Prescriptions    AMOXICILLIN-CLAVULANATE (AUGMENTIN) 875-125 MG PER TABLET    Take 1 tablet by mouth every 12 (twelve) hours for 7 days       Start Date: 3/27/2024 End Date: 4/3/2024       Order Dose: 1 tablet       Quantity: 14 tablet    Refills: 0    NEOMYCIN-POLYMYXIN-HYDROCORTISONE (CORTISPORIN) 0.35%-10,000 UNITS/ML-1% OTIC SUSPENSION    Administer 4 drops into the left ear every 6 (six) hours       Start Date: 3/27/2024 End Date: --       Order Dose: 4 drops       Quantity: 10 mL    Refills: 0           PDMP Review         Value Time User    PDMP Reviewed  Yes 3/22/2023  2:03 PM Renee Talamantes PA-C            ED Provider  Electronically Signed by             Joaquim Jaramillo PA-C  03/27/24 0953

## 2024-03-28 ENCOUNTER — OFFICE VISIT (OUTPATIENT)
Dept: FAMILY MEDICINE CLINIC | Facility: CLINIC | Age: 64
End: 2024-03-28
Payer: COMMERCIAL

## 2024-03-28 ENCOUNTER — TELEPHONE (OUTPATIENT)
Dept: FAMILY MEDICINE CLINIC | Facility: CLINIC | Age: 64
End: 2024-03-28

## 2024-03-28 VITALS
HEIGHT: 62 IN | WEIGHT: 214.29 LBS | SYSTOLIC BLOOD PRESSURE: 137 MMHG | OXYGEN SATURATION: 96 % | BODY MASS INDEX: 39.43 KG/M2 | TEMPERATURE: 97.6 F | HEART RATE: 59 BPM | DIASTOLIC BLOOD PRESSURE: 67 MMHG

## 2024-03-28 DIAGNOSIS — F17.290 VAPING NICOTINE DEPENDENCE, TOBACCO PRODUCT: ICD-10-CM

## 2024-03-28 DIAGNOSIS — Z12.4 SCREENING FOR CERVICAL CANCER: ICD-10-CM

## 2024-03-28 DIAGNOSIS — F41.1 GAD (GENERALIZED ANXIETY DISORDER): ICD-10-CM

## 2024-03-28 DIAGNOSIS — S00.412S: ICD-10-CM

## 2024-03-28 DIAGNOSIS — M25.811 IMPINGEMENT OF RIGHT SHOULDER: ICD-10-CM

## 2024-03-28 DIAGNOSIS — Z00.00 ANNUAL PHYSICAL EXAM: Primary | ICD-10-CM

## 2024-03-28 DIAGNOSIS — I10 ESSENTIAL HYPERTENSION: ICD-10-CM

## 2024-03-28 PROCEDURE — 99396 PREV VISIT EST AGE 40-64: CPT | Performed by: PHYSICIAN ASSISTANT

## 2024-03-28 PROCEDURE — G0143 SCR C/V CYTO,THINLAYER,RESCR: HCPCS | Performed by: PHYSICIAN ASSISTANT

## 2024-03-28 NOTE — PROGRESS NOTES
Assessment/Plan:       1. Annual physical exam    2. Screening for cervical cancer  -     Liquid-based pap, diagnostic; Future; Expected date: 2024  -     Liquid-based pap, diagnostic    3. Abrasion of ear canal, left, sequela    4. Vaping nicotine dependence, tobacco product    5. VERONICA (generalized anxiety disorder)    6. Impingement of right shoulder    7. Essential hypertension  -     Basic metabolic panel; Future     This 63-year-old female sees me for her primary care services.  Patient is having her annual physical along with pelvic and Pap smear.  She is  2 para 2 Ab0 with last menstrual period being in the 40s.  She had 2 C-sections due to pelvic dystocia.  She has not had any bleeding since her ablation.  Patient states that the ablation was done due to hypermenorrhea causing iron deficiency.  Since the ablation was done, she no longer has any iron deficiency.    Patient was seen in the emergency department yesterday due to bleeding coming out of her left ear canal.  She uses Q-tips on a daily basis.  She felt some warm liquid coming out of her ear after going to bed.  The bleeding persisted despite her having cotton swab.  Patient is on Eliquis on a long-term basis and the bleeding continued.  She was seen in the emergency department.  An abrasion was noted in the left ear canal but no perforation of her TM.  A wick was placed and the patient was told that it will naturally work its way out and to not use Q-tips for cleaning anymore.  Patient was also started on antibiotics in the emergency department.    Patient has not had any episodes of tachycardia since she had her cardiac ablation of an accessory pathway.  She is maintained on metoprolol.  For her blood pressure she is on losartan.    She has generalized anxiety.  Buspirone has been given with excellent results.  She has gone from a VERONICA 7 score of 10-7.  She feels that she is less anxious and less irritable since the buspirone was  started.    With regard to her gynecology care, the patient has not had intercourse for several years.  She did have some vaginal stenosis that was noted.  She has not had any postmenopausal bleeding.  Patient has mammogram scheduled for next month and she declined a breast exam today.    A total of 35 minutes was spent rendering care for this patient.  This time included review of the patient's electronic medical record, performing the history and physical, reviewing appropriate labs and/or images, developing a treatment and assessment plan, answering patient's questions and concerns, and documenting the patient visit.  I will see the patient in 6 months.  She will have a basic metabolic profile done 1 week before the visit.    Subjective:      Patient ID: Akiko Kang is a 63 y.o. female.    HPI: Patient here for her annual exam plus pelvic exam and Pap smear.  She is finally recovered from her pneumonia which seemed to linger for quite some time.  She is very worried about her  who had a brain bleed and stroke that was appropriately treated but he continues to have deficits in his memory.  He has an upcoming MRA and she is worried that the bleeding may be restarting because he is having more memory issues.    She uses a Q-tip caused an abrasion in her left tympanic membrane.  She had to be seen in the emergency department because of ongoing oozing as a result of her being on Eliquis.  No further bleeding.    No pain in her chest.  No shortness of breath.  Able to do activities of daily living without problems.  Working full-time in a grocery store.  Does have easy bruising as a result of use of Eliquis.  No melena or hematochezia.    The following portions of the patient's history were reviewed and updated as appropriate: allergies, current medications, past family history, past medical history, past social history, past surgical history, and problem list.    Review of Systems no recent URI residual  "symptoms.    Objective:      /67 (BP Location: Right arm, Patient Position: Sitting, Cuff Size: Standard)   Pulse 59   Temp 97.6 °F (36.4 °C) (Tympanic)   Ht 5' 2\" (1.575 m)   Wt 97.2 kg (214 lb 4.6 oz)   SpO2 96%   BMI 39.19 kg/m²          Physical Exam reviewed vital signs.  She is normotensive.  She is afebrile.    Well-developed well-nourished 63 y.o. year old female who is cooperative with the exam.  Patient is alert and oriented x3.  Patient is appropriate in answering all questions.    HEENT:  Normocephalic.  PERRLA.  EOMs intact.  TMs are deferred by patient request as she had an abrasion over the left TM.  At this present.  No hearing loss noted.  No tragus or pinnae tenderness.  No pre or posterior auricular adenopathy.  Sinuses without tenderness.  Throat without hyperemia.  Neck:  Supple without adenopathy.  Thyroid midline without thyromegaly or bruits.  No carotid bruits.  Chest symmetric and nontender.  Heart regular rate and rhythm.  No murmur rubs or gallops.  Point of maximum impulse not displaced.  Lungs are clear to auscultation.  Breathing is nonlabored.  Aerating bases well.  Abdomen round and soft positive bowel sounds without masses tenderness or organomegaly.  Extremities reveal adequate peripheral pulses without peripheral edema.  Multiple areas of ecchymosis noted throughout her body.    Pelvic exam: normal external genitalia, vulva, vagina, cervix, uterus and adnexa, VULVA: normal appearing vulva with no masses, tenderness or lesions, VAGINA: normal appearing vagina with normal color and discharge, no lesions, atrophic, vaginismus noted with tight vaginal canal., CERVIX: normal appearing cervix without discharge or lesions, nulliparous os, UTERUS: uterus is normal size, shape, consistency and nontender, ADNEXA: normal adnexa in size, nontender and no masses, exam limited by tight vaginal opening, PAP: Pap smear done today, thin-prep method, exam chaperoned by Dayana Alvarez.  "

## 2024-03-28 NOTE — TELEPHONE ENCOUNTER
Informed patient that she needs to call the number on the back of the card and have them update their pcp to Odilia Snow. Patient somewhat verbalized understanding

## 2024-03-30 LAB — COLOGUARD RESULT REPORTABLE: NEGATIVE

## 2024-04-02 ENCOUNTER — OFFICE VISIT (OUTPATIENT)
Dept: OBGYN CLINIC | Facility: CLINIC | Age: 64
End: 2024-04-02
Payer: COMMERCIAL

## 2024-04-02 ENCOUNTER — HOSPITAL ENCOUNTER (OUTPATIENT)
Dept: RADIOLOGY | Facility: CLINIC | Age: 64
Discharge: HOME | End: 2024-04-02
Payer: COMMERCIAL

## 2024-04-02 VITALS
TEMPERATURE: 97.8 F | HEIGHT: 62 IN | SYSTOLIC BLOOD PRESSURE: 120 MMHG | BODY MASS INDEX: 39.38 KG/M2 | HEART RATE: 62 BPM | WEIGHT: 214 LBS | DIASTOLIC BLOOD PRESSURE: 80 MMHG

## 2024-04-02 DIAGNOSIS — M25.561 ACUTE PAIN OF RIGHT KNEE: ICD-10-CM

## 2024-04-02 DIAGNOSIS — M25.511 ACUTE PAIN OF RIGHT SHOULDER: ICD-10-CM

## 2024-04-02 DIAGNOSIS — Z98.890 HISTORY OF MEDIAL MENISCUS REPAIR OF RIGHT KNEE: ICD-10-CM

## 2024-04-02 DIAGNOSIS — M75.41 IMPINGEMENT SYNDROME OF RIGHT SHOULDER: ICD-10-CM

## 2024-04-02 DIAGNOSIS — M17.11 ARTHRITIS OF RIGHT KNEE: ICD-10-CM

## 2024-04-02 DIAGNOSIS — M25.561 ACUTE PAIN OF RIGHT KNEE: Primary | ICD-10-CM

## 2024-04-02 DIAGNOSIS — M19.011 ARTHRITIS OF RIGHT ACROMIOCLAVICULAR JOINT: ICD-10-CM

## 2024-04-02 PROCEDURE — 99213 OFFICE O/P EST LOW 20 MIN: CPT | Performed by: STUDENT IN AN ORGANIZED HEALTH CARE EDUCATION/TRAINING PROGRAM

## 2024-04-02 PROCEDURE — 20610 DRAIN/INJ JOINT/BURSA W/O US: CPT | Performed by: STUDENT IN AN ORGANIZED HEALTH CARE EDUCATION/TRAINING PROGRAM

## 2024-04-02 PROCEDURE — 73562 X-RAY EXAM OF KNEE 3: CPT

## 2024-04-02 RX ORDER — TRIAMCINOLONE ACETONIDE 40 MG/ML
40 INJECTION, SUSPENSION INTRA-ARTICULAR; INTRAMUSCULAR
Status: COMPLETED | OUTPATIENT
Start: 2024-04-02 | End: 2024-04-02

## 2024-04-02 RX ORDER — BUPIVACAINE HYDROCHLORIDE 2.5 MG/ML
2 INJECTION, SOLUTION INFILTRATION; PERINEURAL
Status: COMPLETED | OUTPATIENT
Start: 2024-04-02 | End: 2024-04-02

## 2024-04-02 RX ADMIN — TRIAMCINOLONE ACETONIDE 40 MG: 40 INJECTION, SUSPENSION INTRA-ARTICULAR; INTRAMUSCULAR at 09:15

## 2024-04-02 RX ADMIN — BUPIVACAINE HYDROCHLORIDE 2 ML: 2.5 INJECTION, SOLUTION INFILTRATION; PERINEURAL at 09:15

## 2024-04-02 NOTE — PROGRESS NOTES
1. Acute pain of right knee  XR knee 3 vw right non injury    Large joint arthrocentesis: R knee    bupivacaine (MARCAINE) 0.25 % injection 2 mL    triamcinolone acetonide (KENALOG-40) 40 mg/mL injection 40 mg      2. History of medial meniscus repair of right knee  XR knee 3 vw right non injury    Large joint arthrocentesis: R knee    bupivacaine (MARCAINE) 0.25 % injection 2 mL    triamcinolone acetonide (KENALOG-40) 40 mg/mL injection 40 mg      3. Arthritis of right knee  XR knee 3 vw right non injury    Large joint arthrocentesis: R knee    bupivacaine (MARCAINE) 0.25 % injection 2 mL    triamcinolone acetonide (KENALOG-40) 40 mg/mL injection 40 mg      4. Acute pain of right shoulder        5. Impingement syndrome of right shoulder        6. Arthritis of right acromioclavicular joint            Orders Placed This Encounter   Procedures    Large joint arthrocentesis: R knee    XR knee 3 vw right non injury        Imaging Studies (I personally reviewed images in PACS and report):    X-ray right knee 4/3/2024: No acute osseous abnormalities.  Moderate-severe medial tibiofemoral joint line narrowing with marginal osteophytes.  Degenerative changes of the patellofemoral joint evidenced by superior and inferior osteophyte formation    IMPRESSION:  Acute on chronic right knee pain-reported history of medial meniscus surgery in the past.  Radiographs today showing degenerative changes of the knee, mainly of the medial tibiofemoral joint line and patellofemoral joint line.    Acute on chronic atraumatic right shoulder pain/crepitus secondary to impingement syndrome either from tendinosis versus impingement from the prominent spurring of her severe osteoarthritic AC joint.  Improved/resolved status post subacromial cortisone injection from last visit      Other factors:  Right-hand-dominant and works in an occupation at the [employer] where she does repetitive range of motion movement of her RUE    PLAN:    Clinical exam and  "radiographic imaging reviewed with patient today, with impression as per above. I have discussed with the patient the pathophysiology of this diagnosis and reviewed how the examination correlates with this diagnosis.    Imaging obtained of her right knee today as noted above.  I will follow-up official radiology interpretation.  Treatment options were discussed at length including risk/benefits, patient was agreeable to a right knee intra-articular cortisone injection as per procedure note below in regards to improving her pain of her knee.  Counseled we can repeat the injection every 3 to 4 months needed in regards to pain control.  Alternatively, we could also consider a viscosupplementation injection if there is less than 3 months of relief from the cortisone injection.  Other conservative treatment does include use of compression knee sleeve during activities, Tylenol, topical NSAIDs (needs to avoid oral NSAIDs as she is chronically on Eliquis), icing/heat therapy.    Return in about 3 months (around 7/2/2024).    Portions of the record may have been created with voice recognition software. Occasional wrong word or \"sound a like\" substitutions may have occurred due to the inherent limitations of voice recognition software. Read the chart carefully and recognize, using context, where substitutions have occurred.     CHIEF COMPLAINT:  Chief Complaint   Patient presents with    Right Shoulder - Follow-up    Follow-up         HPI:  Akiko Kang is a 63 y.o. female  who presents for     Visit 4/2/2024:  Initial evaluation of right knee pain:   Of note patient reports history of meniscus tear of her knee in the past that underwent surgical intervention several years ago.  She states she also previously underwent intra-articular cortisone injections of her knee.  She states overall she has always had crepitus of her knee with range of motion movements and aching pain over the medial lateral aspects of her knee.  However " "she feels that over the past several weeks she notes the pain has been worsening despite not having a new injury.  Describes pain as sharp/aching and worse when transitioning from sitting to standing and prolonged walking.  Reports swelling and stiffness of her knee towards the end of the day.  Pain can be of moderate to severe intensity that interfere with her activities of daily living.  Pain can sometimes interfere with her sleep at night.  No relief from Tylenol, icing.  Interested in cortisone injection of her knee if possible.    Follow-up right shoulder pain status post subacromial cortisone injection: Patient reports significant improvement of her pain, stiffness, limited motion, and function of her shoulder today.  She states she is happy with her progress thus far and does not feel further intervention is warranted.        Medical, Surgical, Family, and Social History    Past Medical History:   Diagnosis Date    Allergic     Arthritis     COPD (chronic obstructive pulmonary disease) (HCC)     Coronary artery disease     Diverticulitis of colon     Headache(784.0)     Shingles     Sleep apnea      Past Surgical History:   Procedure Laterality Date    CARDIAC SURGERY  2023     SECTION  ,     KNEE SURGERY       Social History   Social History     Substance and Sexual Activity   Alcohol Use Yes    Comment: occassionally     Social History     Substance and Sexual Activity   Drug Use Never     Social History     Tobacco Use   Smoking Status Former    Current packs/day: 0.00    Types: Cigarettes    Quit date: 2019    Years since quittin.2   Smokeless Tobacco Never     Family History   Problem Relation Age of Onset    Diverticulitis Mother     Heart attack Father 57     Allergies   Allergen Reactions    Ace Inhibitors Cough          Physical Exam  /80 (BP Location: Left arm)   Pulse 62   Temp 97.8 °F (36.6 °C)   Ht 5' 2\" (1.575 m)   Wt 97.1 kg (214 lb)   BMI 39.14 kg/m² "     Constitutional:  see vital signs  Gen: obese, normocephalic/atraumatic, well-groomed  Eyes: No inflammation or discharge of conjunctiva or lids; sclera clear   Pharynx: no inflammation, lesion, or mass of lips  Pulmonary/Chest: Effort normal. No respiratory distress.     Ortho Exam  Right Knee Exam:  Erythema: no  Swelling: no  Increased Warmth: no  Tenderness: +MJL, +medial patellofemoral joint line  ROM: 0-130  Knee flexion strength: 5/5  Knee extension strength: 5/5  Patellar Grind: +  Lachman's: negative  Varus laxity: negative  Valgus laxity: negative  [facility]: negative         Shoulder exam:       RIGHT    Inspection Erythema None     Swelling None     Increased Warmth None    Rotator cuff ER 5/5     IR 5/5     Abduction 5/5    ROM      Abduction 170     ER0 60     IRb T7            Large joint arthrocentesis: R knee  Universal Protocol:  Consent: Verbal consent obtained.  Risks and benefits: risks, benefits and alternatives were discussed  Consent given by: patient  Patient understanding: patient states understanding of the procedure being performed  Site marked: the operative site was marked  Radiology Images displayed and confirmed. If images not available, report reviewed: imaging studies available  Required items: required blood products, implants, devices, and special equipment available  Patient identity confirmed: verbally with patient  Supporting Documentation  Indications: pain   Procedure Details  Location: knee - R knee  Preparation: Patient was prepped and draped in the usual sterile fashion  Needle size: 22 G  Ultrasound guidance: no  Approach: anterolateral  Medications administered: 40 mg triamcinolone acetonide 40 mg/mL; 2 mL bupivacaine 0.25 %    Patient tolerance: patient tolerated the procedure well with no immediate complications  Dressing:  Sterile dressing applied

## 2024-04-08 LAB
LAB AP GYN PRIMARY INTERPRETATION: NORMAL
Lab: NORMAL

## 2024-04-11 ENCOUNTER — HOSPITAL ENCOUNTER (OUTPATIENT)
Dept: RADIOLOGY | Facility: CLINIC | Age: 64
End: 2024-04-11
Payer: COMMERCIAL

## 2024-04-11 VITALS — WEIGHT: 215 LBS | BODY MASS INDEX: 39.56 KG/M2 | HEIGHT: 62 IN

## 2024-04-11 DIAGNOSIS — Z12.31 ENCOUNTER FOR SCREENING MAMMOGRAM FOR BREAST CANCER: ICD-10-CM

## 2024-04-11 PROCEDURE — 77063 BREAST TOMOSYNTHESIS BI: CPT

## 2024-04-11 PROCEDURE — 77067 SCR MAMMO BI INCL CAD: CPT

## 2024-04-24 ENCOUNTER — VBI (OUTPATIENT)
Dept: ADMINISTRATIVE | Facility: OTHER | Age: 64
End: 2024-04-24

## 2024-04-26 DIAGNOSIS — I51.3 MURAL THROMBUS OF CARDIAC APEX: ICD-10-CM

## 2024-04-26 DIAGNOSIS — I48.0 PAROXYSMAL ATRIAL FIBRILLATION (HCC): ICD-10-CM

## 2024-04-27 RX ORDER — APIXABAN 5 MG/1
5 TABLET, FILM COATED ORAL 2 TIMES DAILY
Qty: 60 TABLET | Refills: 3 | Status: SHIPPED | OUTPATIENT
Start: 2024-04-27

## 2024-05-08 ENCOUNTER — VBI (OUTPATIENT)
Dept: ADMINISTRATIVE | Facility: OTHER | Age: 64
End: 2024-05-08

## 2024-05-14 DIAGNOSIS — I10 PRIMARY HYPERTENSION: ICD-10-CM

## 2024-05-14 NOTE — TELEPHONE ENCOUNTER
Reason for call:   [x] Refill   [] Prior Auth  [] Other:     Office:   [x] PCP/Provider - [facility]   [] Specialty/Provider -     Medication: Toprol XL     Dose/Frequency: 25mg - twice daily     Quantity: 60    Pharmacy: [facility] #[id number]     Does the patient have enough for 3 days?   [x] Yes   [] No - Send as HP to POD

## 2024-05-15 RX ORDER — METOPROLOL SUCCINATE 25 MG/1
25 TABLET, EXTENDED RELEASE ORAL 2 TIMES DAILY
Qty: 60 TABLET | Refills: 5 | Status: SHIPPED | OUTPATIENT
Start: 2024-05-15 | End: 2024-11-11

## 2024-07-02 ENCOUNTER — OFFICE VISIT (OUTPATIENT)
Dept: OBGYN CLINIC | Facility: CLINIC | Age: 64
End: 2024-07-02
Payer: COMMERCIAL

## 2024-07-02 VITALS
HEIGHT: 62 IN | WEIGHT: 215 LBS | OXYGEN SATURATION: 97 % | TEMPERATURE: 98.3 F | BODY MASS INDEX: 39.56 KG/M2 | DIASTOLIC BLOOD PRESSURE: 80 MMHG | HEART RATE: 66 BPM | SYSTOLIC BLOOD PRESSURE: 132 MMHG

## 2024-07-02 DIAGNOSIS — M75.41 IMPINGEMENT SYNDROME OF RIGHT SHOULDER: ICD-10-CM

## 2024-07-02 DIAGNOSIS — M17.11 ARTHRITIS OF RIGHT KNEE: ICD-10-CM

## 2024-07-02 DIAGNOSIS — M25.561 CHRONIC PAIN OF RIGHT KNEE: ICD-10-CM

## 2024-07-02 DIAGNOSIS — M19.011 ARTHRITIS OF RIGHT ACROMIOCLAVICULAR JOINT: ICD-10-CM

## 2024-07-02 DIAGNOSIS — M25.511 ACUTE PAIN OF RIGHT SHOULDER: ICD-10-CM

## 2024-07-02 DIAGNOSIS — G89.29 CHRONIC PAIN OF RIGHT KNEE: ICD-10-CM

## 2024-07-02 DIAGNOSIS — Z98.890 HISTORY OF MEDIAL MENISCUS REPAIR OF RIGHT KNEE: Primary | ICD-10-CM

## 2024-07-02 PROCEDURE — 20610 DRAIN/INJ JOINT/BURSA W/O US: CPT | Performed by: STUDENT IN AN ORGANIZED HEALTH CARE EDUCATION/TRAINING PROGRAM

## 2024-07-02 PROCEDURE — 99213 OFFICE O/P EST LOW 20 MIN: CPT | Performed by: STUDENT IN AN ORGANIZED HEALTH CARE EDUCATION/TRAINING PROGRAM

## 2024-07-02 RX ORDER — BUPIVACAINE HYDROCHLORIDE 2.5 MG/ML
2 INJECTION, SOLUTION INFILTRATION; PERINEURAL
Status: COMPLETED | OUTPATIENT
Start: 2024-07-02 | End: 2024-07-02

## 2024-07-02 RX ORDER — TRIAMCINOLONE ACETONIDE 40 MG/ML
40 INJECTION, SUSPENSION INTRA-ARTICULAR; INTRAMUSCULAR
Status: COMPLETED | OUTPATIENT
Start: 2024-07-02 | End: 2024-07-02

## 2024-07-02 RX ADMIN — TRIAMCINOLONE ACETONIDE 40 MG: 40 INJECTION, SUSPENSION INTRA-ARTICULAR; INTRAMUSCULAR at 09:15

## 2024-07-02 RX ADMIN — BUPIVACAINE HYDROCHLORIDE 2 ML: 2.5 INJECTION, SOLUTION INFILTRATION; PERINEURAL at 09:15

## 2024-07-02 NOTE — PATIENT INSTRUCTIONS
"Patient Education     Steroid injection   The Basics   Written by the doctors and editors at [facility]   What is a steroid injection? -- Steroids, also known as \"glucocorticoids,\" are medicines that help reduce swelling and pain. Doctors sometimes inject a steroid medicine into a joint or other part of the body to relieve pain. This is also sometimes called a \"cortisone shot.\"  After the injection, the steroid starts to work within a few days.  How long does a steroid injection work? -- It depends on the person and where the injection is given. For some people, the effects of a steroid injection can last for a few weeks or longer.  Sometimes, the doctor also injects a medicine called a \"local anesthetic\" with the steroid. This might help relieve pain until the steroid starts to work.  A steroid injection can help with pain, but it won't cure the problem that is causing the pain.  How do I prepare for a steroid injection? -- The doctor or nurse will tell you if you need to do anything special to prepare. Before your procedure, your doctor will do an exam.  Your doctor will also ask you about your \"health history.\" This involves asking you questions about any health problems you have or had in the past, past surgeries, and any medicines you take. Tell them about:   Any medicines you are taking - This includes any prescription or \"over-the-counter\" medicines you use, plus any herbal supplements you take. It helps to write down and bring a list of any medicines you take, or bring a bag with all of your medicines with you.   Any allergies you have   Any bleeding problems you have   Any other steroid injections you have had  Ask the doctor or nurse if you have questions or if there is anything you do not understand.  How is a steroid injection given? -- When it is time for the injection:   The doctor will clean the skin over the area where they plan to give the shot. (This is called the \"injection site.\")   They might use " ultrasound or a special kind of X-ray during the procedure. This is to check where to give the steroid.   Sometimes, they might give a shot of medicine to numb the skin.   They will inject the steroid.   Then, they will take out the needle and cover the injection site with a bandage.  What happens after a steroid injection? -- You can go home after the injection.  For the next few days, you might want to:   Apply a cold gel pack, bag of ice, or bag of frozen vegetables to the injection site every 1 to 2 hours, for 15 minutes each time. Put a thin towel between the ice (or other cold object) and your skin.   Rest the treated body part for a few days.   Take medicines to relieve pain. Examples include acetaminophen (sample brand name: Tylenol), ibuprofen (sample brand names: Advil, Motrin), or naproxen (sample brand name: Aleve).  If you have diabetes, the doctor might want you to check your blood sugar levels more often for a few days. The steroid medicine might temporarily raise your blood sugar.  What are the risks of a steroid injection? -- Your doctor will talk to you about all of the possible risks, and answer your questions. Possible risks include:   Bleeding, bruising, or soreness at the injection site   Damage to parts near the injection site - This might include cartilage damage, injury to nerves, tendon rupture, or thinning of skin and bones.   Skin around the injection site becoming lighter or whiter in color   Infection   Health conditions like diabetes or high blood pressure getting worse for a few days   Allergic reaction to the medicine  What else should I know? -- Most of the time, doctors limit the total number of steroid injections to a certain area.  A steroid injection might be only 1 part of your treatment plan. Take your other medicines as instructed. Also, follow your doctor's recommendations about other treatments. These might include things like physical therapy or devices like a brace or  cane.  All topics are updated as new evidence becomes available and our peer review process is complete.  This topic retrieved from [email or web address] on: Apr 25, 2024.  Topic 663706 Version 2.0  Release: 32.4.2 - C32.114  © 2024 [employer] and/or its affiliates. All rights reserved.  Consumer Information Use and Disclaimer   Disclaimer: This generalized information is a limited summary of diagnosis, treatment, and/or medication information. It is not meant to be comprehensive and should be used as a tool to help the user understand and/or assess potential diagnostic and treatment options. It does NOT include all information about conditions, treatments, medications, side effects, or risks that may apply to a specific patient. It is not intended to be medical advice or a substitute for the medical advice, diagnosis, or treatment of a health care provider based on the health care provider's examination and assessment of a patient's specific and unique circumstances. Patients must speak with a health care provider for complete information about their health, medical questions, and treatment options, including any risks or benefits regarding use of medications. This information does not endorse any treatments or medications as safe, effective, or approved for treating a specific patient. UpToDate, Inc. and its affiliates disclaim any warranty or liability relating to this information or the use thereof.The use of this information is governed by the Terms of Use, available at https://www.woltersEdumedicsuwer.com/en/know/clinical-effectiveness-terms. 2024© UpToDate, Inc. and its affiliates and/or licensors. All rights reserved.  Copyright   © 2024 UpToDate, Inc. and/or its affiliates. All rights reserved.

## 2024-07-02 NOTE — PROGRESS NOTES
1. History of medial meniscus repair of right knee  Injection Procedure Prior Authorization      2. Arthritis of right knee  Injection Procedure Prior Authorization      3. Chronic pain of right knee  Injection Procedure Prior Authorization      4. Acute pain of right shoulder  Large joint arthrocentesis: R subacromial bursa      5. Impingement syndrome of right shoulder  Large joint arthrocentesis: R subacromial bursa      6. Arthritis of right acromioclavicular joint  Large joint arthrocentesis: R subacromial bursa            Orders Placed This Encounter   Procedures    Large joint arthrocentesis: R subacromial bursa    Injection Procedure Prior Authorization        Imaging Studies (I personally reviewed images in PACS and report):    X-ray right knee 4/3/2024: No acute osseous abnormalities.  Moderate-severe medial tibiofemoral joint line narrowing with marginal osteophytes.  Degenerative changes of the patellofemoral joint evidenced by superior and inferior osteophyte formation  X-ray right shoulder 3/15/2024: No acute osseous abnormalities.  Moderate osteoarthritis of the AC joint.  Soft tissues unremarkable.    IMPRESSION:  Chronic right knee pain- reports minimal to no relief from CSI;   history of medial meniscus surgery in the past.  Radiographs showing degenerative changes of the knee, mainly of the medial tibiofemoral joint line and patellofemoral joint line.    Acute on chronic atraumatic right shoulder pain/crepitus secondary to impingement syndrome either from tendinosis versus impingement from the prominent spurring of her severe osteoarthritic AC joint.   Reports recurrent pain over the past couple weeks from working at [employer] (ddx: impingement/bursitis, AC OA pain given repetitive movements of her RUE)  Prior relief from subacromial CSI in the past      Other factors:  Right-hand-dominant and works in an occupation at the [employer] where she does repetitive range of motion movement of her  "RUE    PLAN:    Clinical exam and radiographic imaging reviewed with patient today, with impression as per above. I have discussed with the patient the pathophysiology of this diagnosis and reviewed how the examination correlates with this diagnosis.    Imaging obtained of her right knee today as noted above.  I will follow-up official radiology interpretation.  In regards to her right shoulder pain, treatment options were discussed including risk/benefits and patient was agreeable to a repeat subacromial cortisone injection today of her right shoulder as per procedure note below.  Counseled we can repeat every 3 to 4 months as needed in regards to pain control.  I counseled the importance of maintaining a home exercise program/stretching in order to minimize aggravation of her shoulder pain.  In regards to her right knee pain, given patient reports no relief from the cortisone injection of her right knee since last visit as well as limited response to conservative treatments overall her right knee, I did offer a viscosupplementation injection as an alternative option.  Patient was agreeable to this procedure and an order was placed today.    Return for Follow up after approval of gel injection for right knee.    Portions of the record may have been created with voice recognition software. Occasional wrong word or \"sound a like\" substitutions may have occurred due to the inherent limitations of voice recognition software. Read the chart carefully and recognize, using context, where substitutions have occurred.     CHIEF COMPLAINT:  Chief Complaint   Patient presents with    Right Shoulder - Follow-up    Follow-up   Follow up right knee      HPI:  Akiko Kang is a 64 y.o. female  who presents for     Visit 7/2/2024:  Follow up right knee pain  Last seen on 4/2/2024 in which she underwent CSI of her right knee  Patient reports minimal to no relief from the injection.  She continues report sharp/aching pains worse with " weightbearing especially towards the end of the day.  Pain can be of moderate to severe intensity with pain score greater than 8/10 at its worst.  She reports stiffness and swelling of her knee towards the end of the day.  Denies any discoloration.  Denies any new injury since last visit.  She was interested in possibly trying the viscosupplementation injection as discussed from last visit as she states she has never undergone this injection and prefers to avoid any surgical intervention if possible.    In regards to her right shoulder, patient was last given a subacromial cortisone injection in 2024.  She states she had been doing well with this injection until about a week or 2 ago.  She does attribute her shoulder pain being aggravated again given her occupation at the [employer].  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  Patient states that she has maintained a home exercise program and stretching of her shoulder and tries to alleviate with topical creams and oral acetaminophen.  She has to avoid NSAIDs given she takes Eliquis daily.  She states she notices she has progressively increasing intensity aching/sharp pains towards the end of the day.  She is interested in repeating a cortisone injection today given her significant improvement from prior injections.      Medical, Surgical, Family, and Social History    Past Medical History:   Diagnosis Date    Allergic     Arthritis     COPD (chronic obstructive pulmonary disease) (HCC)     Coronary artery disease     Diverticulitis of colon     Headache(784.0)     Shingles     Sleep apnea      Past Surgical History:   Procedure Laterality Date    CARDIAC SURGERY  2023     SECTION  ,     KNEE SURGERY       Social History   Social History     Substance and Sexual Activity   Alcohol Use Yes    Comment: occassionally     Social History     Substance and  "Sexual Activity   Drug Use Never     Social History     Tobacco Use   Smoking Status Former    Current packs/day: 0.00    Types: Cigarettes    Quit date: 2019    Years since quittin.5   Smokeless Tobacco Never     Family History   Problem Relation Age of Onset    Diverticulitis Mother     Heart attack Father 57    No Known Problems Sister     No Known Problems Sister     No Known Problems Daughter     No Known Problems Daughter     No Known Problems Maternal Grandmother     No Known Problems Maternal Grandfather     No Known Problems Paternal Grandmother     No Known Problems Paternal Grandfather     Breast cancer Cousin     No Known Problems Maternal Aunt     No Known Problems Paternal Aunt     No Known Problems Paternal Aunt      Allergies   Allergen Reactions    Ace Inhibitors Cough          Physical Exam  /80 (BP Location: Left arm)   Pulse 66   Temp 98.3 °F (36.8 °C)   Ht 5' 2\" (1.575 m)   Wt 97.5 kg (215 lb)   SpO2 97%   BMI 39.32 kg/m²     Constitutional:  see vital signs  Gen: obese, normocephalic/atraumatic, well-groomed  Eyes: No inflammation or discharge of conjunctiva or lids; sclera clear   Pharynx: no inflammation, lesion, or mass of lips  Pulmonary/Chest: Effort normal. No respiratory distress.     Ortho Exam  Right Knee Exam:  Erythema: no  Swelling: no  Increased Warmth: no  Tenderness: +MJL, +medial patellofemoral joint line  ROM: 0-130  Knee flexion strength: 5/5  Knee extension strength: 5/5  Patellar Grind: +  Varus laxity: negative  Valgus laxity: negative    Shoulder exam:       RIGHT    Inspection Erythema None     Swelling None     Increased Warmth None    Rotator cuff ER 5-/5     IR 5/5     Abduction 5-/5    ROM      Abduction 150     ER0 60     IRb To10    TTP:  + AC joint, +laterally over greater tuberosity/subacromial aspect of shoulder    Special Tests:             Cross body Adduction Positive     Speeds  Negative     Yergason's Negative     Drop arm Negative  "    Neer Positive     Patton Positive        Right elbow, wrist, and and forearm ROM full, painless with passive ROM, no ttp or crepitance throughout extremities below shoulder joint                Large joint arthrocentesis: R subacromial bursa  Universal Protocol:  Consent: Verbal consent obtained.  Risks and benefits: risks, benefits and alternatives were discussed  Consent given by: patient  Patient understanding: patient states understanding of the procedure being performed  Site marked: the operative site was marked  Radiology Images displayed and confirmed. If images not available, report reviewed: imaging studies available  Patient identity confirmed: verbally with patient  Supporting Documentation  Indications: pain   Procedure Details  Location: shoulder - R subacromial bursa  Preparation: Patient was prepped and draped in the usual sterile fashion  Needle size: 22 G  Ultrasound guidance: no  Approach: posterior  Medications administered: 40 mg triamcinolone acetonide 40 mg/mL; 2 mL bupivacaine 0.25 %    Patient tolerance: patient tolerated the procedure well with no immediate complications  Dressing:  Sterile dressing applied

## 2024-07-03 ENCOUNTER — OFFICE VISIT (OUTPATIENT)
Dept: FAMILY MEDICINE CLINIC | Facility: CLINIC | Age: 64
End: 2024-07-03
Payer: COMMERCIAL

## 2024-07-03 VITALS
OXYGEN SATURATION: 96 % | DIASTOLIC BLOOD PRESSURE: 66 MMHG | SYSTOLIC BLOOD PRESSURE: 136 MMHG | WEIGHT: 214.29 LBS | BODY MASS INDEX: 39.43 KG/M2 | HEIGHT: 62 IN | HEART RATE: 75 BPM | TEMPERATURE: 98.7 F

## 2024-07-03 DIAGNOSIS — M25.811 IMPINGEMENT OF RIGHT SHOULDER: ICD-10-CM

## 2024-07-03 DIAGNOSIS — J30.2 SEASONAL ALLERGIES: ICD-10-CM

## 2024-07-03 DIAGNOSIS — I10 ESSENTIAL HYPERTENSION: ICD-10-CM

## 2024-07-03 DIAGNOSIS — J02.9 PHARYNGITIS, UNSPECIFIED ETIOLOGY: Primary | ICD-10-CM

## 2024-07-03 PROCEDURE — 99213 OFFICE O/P EST LOW 20 MIN: CPT | Performed by: PHYSICIAN ASSISTANT

## 2024-07-03 NOTE — PROGRESS NOTES
Assessment/Plan:       1. Pharyngitis, unspecified etiology  2. Seasonal allergies  3. Essential hypertension  4. Impingement of right shoulder      This 64-year-old female sees me for primary care services.  Patient has been around 2 coworkers who tested positive for strep.  She was also around her granddaughter who was having issues with a sore throat after she returned from a beach vacation.    Patient is having issues with coughing and coryza.  No fever or chills.  She also has a slight headache.  No abdominal pain.  She continues to have a lot of postnasal drainage down the back of her throat which is instigating her coughing.  No nuchal rigidity or soreness of lymph nodes in her neck.    Patient continues to vape which she would like to stop.  She is having a lot of problems with seasonal allergies and I once again recommended the generic form of Flonase Rhinocort or Nasacort.    Patient has essential hypertension which is well-controlled with her blood pressure medications.    She recently had her right shoulder injected for impingement by .  She is hoping to get the gel inserted into her knee since the cortisone injections did not last very long for her.    A total of 25 minutes was spent rendering care for this patient.  This time included review of the patient's electronic medical record, performing the history and physical, reviewing appropriate labs and/or images, developing a treatment and assessment plan, answering patient's questions and concerns, and documenting the patient visit.  I will see the patient for her scheduled appointment on September 30 and we will include a Pap smear as her last Pap smear did not have enough cellular elements.  Subjective:      Patient ID: Akiko Kang is a 64 y.o. female.    HPI: History as above.  Cough is nonproductive.  She can feel a lot of postnasal drainage which causes her to cough.  She has used nasal steroids in the past with good effect.  No fever or  "chills or abdominal pain.  Has had exposure to strep but not close exposure to coworkers at work.    The following portions of the patient's history were reviewed and updated as appropriate: allergies, current medications, past family history, past medical history, past social history, past surgical history, and problem list.    Review of Systems having coryza postnasal drainage and coughing secondary to postnasal drainage.    Objective:      /66 (BP Location: Right arm, Patient Position: Sitting, Cuff Size: Large)   Pulse 75   Temp 98.7 °F (37.1 °C) (Tympanic)   Ht 5' 2\" (1.575 m)   Wt 97.2 kg (214 lb 4.6 oz)   SpO2 96%   BMI 39.19 kg/m²          Physical Exam reviewed vital signs.  She is afebrile.  Blood pressure is stable.  She is not tachycardic.    No conjunctival inflammation.  No sinus tenderness on palpation.  Throat was red with very mild ulceration on the right posterior tonsillar pillar.  No tonsillar hypertrophy or exudate.  Neck was without any adenopathy.    Heart regular rate without murmur rub or gallops.  Lungs are clear to auscultation.  "

## 2024-07-18 ENCOUNTER — OFFICE VISIT (OUTPATIENT)
Dept: URGENT CARE | Facility: CLINIC | Age: 64
End: 2024-07-18
Payer: COMMERCIAL

## 2024-07-18 VITALS
RESPIRATION RATE: 16 BRPM | OXYGEN SATURATION: 96 % | HEART RATE: 74 BPM | BODY MASS INDEX: 38.83 KG/M2 | DIASTOLIC BLOOD PRESSURE: 72 MMHG | SYSTOLIC BLOOD PRESSURE: 142 MMHG | HEIGHT: 62 IN | WEIGHT: 211 LBS | TEMPERATURE: 97.7 F

## 2024-07-18 DIAGNOSIS — M54.50 ACUTE RIGHT-SIDED LOW BACK PAIN, UNSPECIFIED WHETHER SCIATICA PRESENT: Primary | ICD-10-CM

## 2024-07-18 LAB
SL AMB  POCT GLUCOSE, UA: NEGATIVE
SL AMB LEUKOCYTE ESTERASE,UA: NEGATIVE
SL AMB POCT BILIRUBIN,UA: NEGATIVE
SL AMB POCT BLOOD,UA: NEGATIVE
SL AMB POCT CLARITY,UA: CLEAR
SL AMB POCT COLOR,UA: YELLOW
SL AMB POCT KETONES,UA: NEGATIVE
SL AMB POCT NITRITE,UA: NEGATIVE
SL AMB POCT PH,UA: 5
SL AMB POCT SPECIFIC GRAVITY,UA: 1.01
SL AMB POCT URINE PROTEIN: NEGATIVE
SL AMB POCT UROBILINOGEN: NEGATIVE

## 2024-07-18 PROCEDURE — 87086 URINE CULTURE/COLONY COUNT: CPT

## 2024-07-18 PROCEDURE — S9088 SERVICES PROVIDED IN URGENT: HCPCS

## 2024-07-18 PROCEDURE — 81002 URINALYSIS NONAUTO W/O SCOPE: CPT

## 2024-07-18 PROCEDURE — 99213 OFFICE O/P EST LOW 20 MIN: CPT

## 2024-07-18 NOTE — PATIENT INSTRUCTIONS
Ibuprofen or tylenol as needed   Heat/ice  Rest   If any worsening pain, fevers, chills, blood in urine, or other worrisome symptoms, proceed to ER  Follow up with PCP in 3-5 days.  Proceed to  ER if symptoms worsen.    If tests are performed, our office will contact you with results only if changes need to made to the care plan discussed with you at the visit. You can review your full results on [facility].

## 2024-07-18 NOTE — PROGRESS NOTES
[facility]        NAME: Akiko Kang is a 64 y.o. female  : 1960    MRN: [id number]  DATE: 2024  TIME: 12:27 PM    Assessment and Plan   Acute right-sided low back pain, unspecified whether sciatica present [M54.50]  1. Acute right-sided low back pain, unspecified whether sciatica present  POCT urine dip    Urine culture    Urine culture        UA: neg    Patient has slight tenderness to palpation on exam but no CVA tenderness.  Urine is negative for UTI and negative for blood.  Appears to be MSK related.  Will send out urine culture and call if any positive findings.  Went over strict instructions on when to proceed to ER.  Patient verbalized understanding.    Patient Instructions     Ibuprofen or tylenol as needed   Heat/ice  Rest   If any worsening pain, fevers, chills, blood in urine, or other worrisome symptoms, proceed to ER  Follow up with PCP in 3-5 days.  Proceed to  ER if symptoms worsen.    If tests are performed, our office will contact you with results only if changes need to made to the care plan discussed with you at the visit. [facility]    Chief Complaint     Chief Complaint   Patient presents with    Back Pain     C/o pain in right flank x 5 days. Pain increases with coughing and laying on right side         History of Present Illness       Back Pain  This is a new problem. Episode onset: 5 days. The pain is present in the thoracic spine. Quality: sore. The pain does not radiate. The pain is at a severity of 6/10. The symptoms are aggravated by lying down and coughing. Pertinent negatives include no bladder incontinence, bowel incontinence, fever, numbness, paresis, paresthesias, perianal numbness, tingling or weakness. Treatments tried: tylenol.   She denies any injury.  She has no pain with twisting.  She denies any urinary symptoms.  However she does states Lasix which normally urinates frequently.  She denies any history  of kidney problems.  She denies any fevers or chills.    Review of Systems   Review of Systems   Constitutional:  Negative for fever.   Gastrointestinal:  Negative for bowel incontinence.   Genitourinary:  Negative for bladder incontinence.   Musculoskeletal:  Positive for back pain.   Neurological:  Negative for tingling, weakness, numbness and paresthesias.         Current Medications       Current Outpatient Medications:     albuterol (PROVENTIL HFA,VENTOLIN HFA) 90 mcg/act inhaler, Inhale 2 puffs every 6 (six) hours as needed for wheezing, Disp: 18 g, Rfl: 1    aspirin 81 mg chewable tablet, Chew 81 mg daily, Disp: , Rfl:     atorvastatin (LIPITOR) 40 mg tablet, Take 1 tablet (40 mg total) by mouth every evening, Disp: 90 tablet, Rfl: 3    busPIRone (BUSPAR) 7.5 mg tablet, Take 1 tablet (7.5 mg total) by mouth 2 (two) times a day, Disp: 60 tablet, Rfl: 5    Eliquis 5 MG, take 1 tablet by mouth twice a day, Disp: 60 tablet, Rfl: 3    furosemide (LASIX) 40 mg tablet, Take 1 tablet (40 mg total) by mouth 2 (two) times a day, Disp: 180 tablet, Rfl: 2    losartan (COZAAR) 50 mg tablet, Take 1 tablet (50 mg total) by mouth daily, Disp: 90 tablet, Rfl: 3    metoprolol succinate (TOPROL-XL) 25 mg 24 hr tablet, Take 1 tablet (25 mg total) by mouth 2 (two) times a day, Disp: 60 tablet, Rfl: 5    Multiple Vitamin (One-Daily Multi-Vitamin) TABS, Take by mouth, Disp: , Rfl:     potassium chloride (K-DUR,KLOR-CON) 20 mEq tablet, Take 40 mEq by mouth daily, Disp: , Rfl:     Specialty Vitamins Products (Advanced Collagen) TABS, Take by mouth, Disp: , Rfl:     Current Allergies     Allergies as of 07/18/2024 - Reviewed 07/18/2024   Allergen Reaction Noted    Ace inhibitors Cough 06/10/2020            The following portions of the patient's history were reviewed and updated as appropriate: allergies, current medications, past family history, past medical history, past social history, past surgical history and problem list.  "    Past Medical History:   Diagnosis Date    Allergic     Arthritis     COPD (chronic obstructive pulmonary disease) (HCC)     Coronary artery disease     Diverticulitis of colon     Headache(784.0)     Hypertension     Shingles     Sleep apnea        Past Surgical History:   Procedure Laterality Date    AV NODE ABLATION      CARDIAC SURGERY  2023     SECTION  ,     KNEE SURGERY         Family History   Problem Relation Age of Onset    Hyperlipidemia Mother     Hypertension Mother     Diverticulitis Mother     Hypertension Father     Hyperlipidemia Father     Heart disease Father     Heart attack Father 57    No Known Problems Sister     No Known Problems Sister     No Known Problems Daughter     No Known Problems Daughter     No Known Problems Maternal Aunt     No Known Problems Paternal Aunt     No Known Problems Paternal Aunt     No Known Problems Maternal Grandmother     No Known Problems Maternal Grandfather     No Known Problems Paternal Grandmother     No Known Problems Paternal Grandfather     Breast cancer Cousin          Medications have been verified.        Objective   /72   Pulse 74   Temp 97.7 °F (36.5 °C)   Resp 16   Ht 5' 2\" (1.575 m)   Wt 95.7 kg (211 lb)   SpO2 96%   BMI 38.59 kg/m²        Physical Exam     Physical Exam  Constitutional:       Appearance: She is well-developed.   HENT:      Head: Normocephalic.   Cardiovascular:      Rate and Rhythm: Normal rate and regular rhythm.      Heart sounds: Normal heart sounds.   Pulmonary:      Effort: Pulmonary effort is normal.      Breath sounds: Normal breath sounds.   Abdominal:      General: Abdomen is flat. Bowel sounds are normal.      Palpations: Abdomen is soft.      Tenderness: There is no abdominal tenderness. There is no right CVA tenderness or left CVA tenderness. Negative signs include Carpenter's sign and McBurney's sign.   Musculoskeletal:         General: Tenderness (R flank TTP but no CVA tenderness) " present.   Skin:     General: Skin is warm and dry.      Capillary Refill: Capillary refill takes less than 2 seconds.   Neurological:      General: No focal deficit present.      Mental Status: She is alert.   Psychiatric:         Mood and Affect: Mood normal.         Behavior: Behavior normal.

## 2024-07-18 NOTE — LETTER
July 18, 2024     Patient: Akiko Kang   YOB: 1960   Date of Visit: 7/18/2024       To Whom It May Concern:    It is my medical opinion that Akiko Kang may return to work on 7/19/2024 .    If you have any questions or concerns, please don't hesitate to call.         Sincerely,        Kj Montejo PA-C    CC: No Recipients

## 2024-07-19 LAB — BACTERIA UR CULT: NORMAL

## 2024-08-02 ENCOUNTER — PROCEDURE VISIT (OUTPATIENT)
Dept: OBGYN CLINIC | Facility: CLINIC | Age: 64
End: 2024-08-02
Payer: COMMERCIAL

## 2024-08-02 VITALS
TEMPERATURE: 97.6 F | DIASTOLIC BLOOD PRESSURE: 80 MMHG | SYSTOLIC BLOOD PRESSURE: 130 MMHG | HEIGHT: 62 IN | OXYGEN SATURATION: 95 % | BODY MASS INDEX: 38.83 KG/M2 | HEART RATE: 76 BPM | WEIGHT: 211 LBS

## 2024-08-02 DIAGNOSIS — M17.11 ARTHRITIS OF RIGHT KNEE: ICD-10-CM

## 2024-08-02 DIAGNOSIS — M25.561 CHRONIC PAIN OF RIGHT KNEE: ICD-10-CM

## 2024-08-02 DIAGNOSIS — Z98.890 HISTORY OF MEDIAL MENISCUS REPAIR OF RIGHT KNEE: Primary | ICD-10-CM

## 2024-08-02 DIAGNOSIS — G89.29 CHRONIC PAIN OF RIGHT KNEE: ICD-10-CM

## 2024-08-02 PROCEDURE — 20610 DRAIN/INJ JOINT/BURSA W/O US: CPT | Performed by: STUDENT IN AN ORGANIZED HEALTH CARE EDUCATION/TRAINING PROGRAM

## 2024-08-02 NOTE — PATIENT INSTRUCTIONS
Patient Education     Viscosupplementation   Why is this procedure done?   Your joints are where two bones meet. The ends of the bones are covered with a protective coating of cartilage. This helps them glide smoothly during movement. A fluid also helps the joint move more smoothly. With years of use, the cartilage may begin to wear away. This causes pain in the joints. This procedure is done to relieve the pain. A special fluid is used to help the bones glide more easily. It also acts like a shock absorber. This is most often done on the knee joints.  What will the results be?   Lubricates the joint  Less pain in the joints during movement or weight bearing  Improved joint mobility or movement  What happens before the procedure?   Your doctor may ask you to try other ways to treat osteoarthritis or OA, such as exercise, physical therapy, drugs for pain, applying hot compress, and losing weight.  Talk to your doctor about all the drugs you are taking. Be sure to include all prescription and over-the-counter (OTC) drugs, and herbal supplements. Tell the doctor about any drug allergy. Bring a list of drugs you take with you. Some drugs may interact with the injection.  What happens during the procedure?   Your doctor will clean the skin area where the injection will be given. You will be given a drug called local anesthesia. This will numb your skin and you will not feel any pain.  In some cases, your doctor might use imaging like x-ray or ultrasound to make sure they inject the right spot.  If you have excess fluid in your joint, your doctor may remove the fluid before beginning.  A needle will be used to inject the hyaluronic acid into the joint. Hyaluronic acid is a natural fluid in your body. It lubricates the joint to ease body movements.  The doctor will cover the injection site with a small bandage to prevent infection.  The procedure may only take a couple of minutes.  What happens after the procedure?   You  may feel slight pain, warmth, and swelling around the joint area.  You will be able to go home after the injection.  What care is needed at home?   Ask your doctor what you need to do when you go home. Make sure you ask questions if you do not understand what the doctor says. This way you will know what you need to do.  Place an ice pack or a bag of frozen peas wrapped in a towel over the painful part. Never put ice right on the skin. Do not leave the ice on more than 10 to 15 minutes at a time.  Rest for the first few days after the procedure. Avoid strenuous activities like heavy lifting, jogging, standing for a long time, and hard exercise.  What follow-up care is needed?   Your doctor may ask you to make visits to the office to check on your progress. Be sure to keep these visits. Your doctor may want you to have more injections.  What lifestyle changes are needed?   Ask your doctor about when you can go back to your normal activities like exercise or work.  What problems could happen?   Pain, redness, and swelling around the injection site  Infection of the joint  Fever  Allergic reaction  Itching  Rash  Bruising  Bleeding in the joint  No change in pain after injection  Last Reviewed Date   2021-03-30  Consumer Information Use and Disclaimer   This generalized information is a limited summary of diagnosis, treatment, and/or medication information. It is not meant to be comprehensive and should be used as a tool to help the user understand and/or assess potential diagnostic and treatment options. It does NOT include all information about conditions, treatments, medications, side effects, or risks that may apply to a specific patient. It is not intended to be medical advice or a substitute for the medical advice, diagnosis, or treatment of a health care provider based on the health care provider's examination and assessment of a patient’s specific and unique circumstances. Patients must speak with a health care  provider for complete information about their health, medical questions, and treatment options, including any risks or benefits regarding use of medications. This information does not endorse any treatments or medications as safe, effective, or approved for treating a specific patient. [employer] and its affiliates disclaim any warranty or liability relating to this information or the use thereof. The use of this information is governed by the Terms of Use, available at https://www.[employer]uwer.com/en/know/clinical-effectiveness-terms   Copyright   Copyright © 2024 [employer] and its affiliates and/or licensors. All rights reserved.

## 2024-08-02 NOTE — PROGRESS NOTES
1. History of medial meniscus repair of right knee  Large joint arthrocentesis: R knee      2. Arthritis of right knee  Large joint arthrocentesis: R knee      3. Chronic pain of right knee  Large joint arthrocentesis: R knee          Large joint arthrocentesis: R knee  Universal Protocol:  Consent: Verbal consent obtained.  Risks and benefits: risks, benefits and alternatives were discussed  Consent given by: patient  Patient understanding: patient states understanding of the procedure being performed  Site marked: the operative site was marked  Radiology Images displayed and confirmed. If images not available, report reviewed: imaging studies available  Patient identity confirmed: verbally with patient  Supporting Documentation  Indications: pain   Procedure Details  Location: knee - R knee  Preparation: Patient was prepped and draped in the usual sterile fashion  Needle size: 22 G  Ultrasound guidance: no  Approach: anterolateral  Medications administered: 2 mL sodium hyaluronate 16.8 MG/2ML    Patient tolerance: patient tolerated the procedure well with no immediate complications  Dressing:  Sterile dressing applied        F/u 1 week for gelsyn-3 #2/3 for right knee

## 2024-08-07 ENCOUNTER — PROCEDURE VISIT (OUTPATIENT)
Dept: OBGYN CLINIC | Facility: CLINIC | Age: 64
End: 2024-08-07
Payer: COMMERCIAL

## 2024-08-07 VITALS
OXYGEN SATURATION: 96 % | HEART RATE: 71 BPM | WEIGHT: 211 LBS | TEMPERATURE: 98 F | SYSTOLIC BLOOD PRESSURE: 145 MMHG | HEIGHT: 62 IN | BODY MASS INDEX: 38.83 KG/M2 | DIASTOLIC BLOOD PRESSURE: 78 MMHG

## 2024-08-07 DIAGNOSIS — M25.561 CHRONIC PAIN OF RIGHT KNEE: ICD-10-CM

## 2024-08-07 DIAGNOSIS — G89.29 CHRONIC PAIN OF RIGHT KNEE: ICD-10-CM

## 2024-08-07 DIAGNOSIS — Z98.890 HISTORY OF MEDIAL MENISCUS REPAIR OF RIGHT KNEE: Primary | ICD-10-CM

## 2024-08-07 DIAGNOSIS — M17.11 ARTHRITIS OF RIGHT KNEE: ICD-10-CM

## 2024-08-07 PROCEDURE — 20610 DRAIN/INJ JOINT/BURSA W/O US: CPT | Performed by: STUDENT IN AN ORGANIZED HEALTH CARE EDUCATION/TRAINING PROGRAM

## 2024-08-07 NOTE — PROGRESS NOTES
1. History of medial meniscus repair of right knee  Large joint arthrocentesis: R knee      2. Arthritis of right knee  Large joint arthrocentesis: R knee      3. Chronic pain of right knee  Large joint arthrocentesis: R knee            Large joint arthrocentesis: R knee  Universal Protocol:  Consent: Verbal consent obtained.  Risks and benefits: risks, benefits and alternatives were discussed  Consent given by: patient  Patient understanding: patient states understanding of the procedure being performed  Site marked: the operative site was marked  Radiology Images displayed and confirmed. If images not available, report reviewed: imaging studies available  Patient identity confirmed: verbally with patient  Supporting Documentation  Indications: pain   Procedure Details  Location: knee - R knee  Preparation: Patient was prepped and draped in the usual sterile fashion  Needle size: 22 G  Ultrasound guidance: no  Approach: anterolateral  Medications administered: 2 mL sodium hyaluronate 16.8 MG/2ML    Patient tolerance: patient tolerated the procedure well with no immediate complications  Dressing:  Sterile dressing applied        F/u 1 week for gelsyn-3 #3/3 for right knee

## 2024-08-07 NOTE — PATIENT INSTRUCTIONS
Patient Education     Viscosupplementation   Why is this procedure done?   Your joints are where two bones meet. The ends of the bones are covered with a protective coating of cartilage. This helps them glide smoothly during movement. A fluid also helps the joint move more smoothly. With years of use, the cartilage may begin to wear away. This causes pain in the joints. This procedure is done to relieve the pain. A special fluid is used to help the bones glide more easily. It also acts like a shock absorber. This is most often done on the knee joints.  What will the results be?   Lubricates the joint  Less pain in the joints during movement or weight bearing  Improved joint mobility or movement  What happens before the procedure?   Your doctor may ask you to try other ways to treat osteoarthritis or OA, such as exercise, physical therapy, drugs for pain, applying hot compress, and losing weight.  Talk to your doctor about all the drugs you are taking. Be sure to include all prescription and over-the-counter (OTC) drugs, and herbal supplements. Tell the doctor about any drug allergy. Bring a list of drugs you take with you. Some drugs may interact with the injection.  What happens during the procedure?   Your doctor will clean the skin area where the injection will be given. You will be given a drug called local anesthesia. This will numb your skin and you will not feel any pain.  In some cases, your doctor might use imaging like x-ray or ultrasound to make sure they inject the right spot.  If you have excess fluid in your joint, your doctor may remove the fluid before beginning.  A needle will be used to inject the hyaluronic acid into the joint. Hyaluronic acid is a natural fluid in your body. It lubricates the joint to ease body movements.  The doctor will cover the injection site with a small bandage to prevent infection.  The procedure may only take a couple of minutes.  What happens after the procedure?   You  may feel slight pain, warmth, and swelling around the joint area.  You will be able to go home after the injection.  What care is needed at home?   Ask your doctor what you need to do when you go home. Make sure you ask questions if you do not understand what the doctor says. This way you will know what you need to do.  Place an ice pack or a bag of frozen peas wrapped in a towel over the painful part. Never put ice right on the skin. Do not leave the ice on more than 10 to 15 minutes at a time.  Rest for the first few days after the procedure. Avoid strenuous activities like heavy lifting, jogging, standing for a long time, and hard exercise.  What follow-up care is needed?   Your doctor may ask you to make visits to the office to check on your progress. Be sure to keep these visits. Your doctor may want you to have more injections.  What lifestyle changes are needed?   Ask your doctor about when you can go back to your normal activities like exercise or work.  What problems could happen?   Pain, redness, and swelling around the injection site  Infection of the joint  Fever  Allergic reaction  Itching  Rash  Bruising  Bleeding in the joint  No change in pain after injection  Last Reviewed Date   2021-03-30  Consumer Information Use and Disclaimer   This generalized information is a limited summary of diagnosis, treatment, and/or medication information. It is not meant to be comprehensive and should be used as a tool to help the user understand and/or assess potential diagnostic and treatment options. It does NOT include all information about conditions, treatments, medications, side effects, or risks that may apply to a specific patient. It is not intended to be medical advice or a substitute for the medical advice, diagnosis, or treatment of a health care provider based on the health care provider's examination and assessment of a patient’s specific and unique circumstances. Patients must speak with a health care  provider for complete information about their health, medical questions, and treatment options, including any risks or benefits regarding use of medications. This information does not endorse any treatments or medications as safe, effective, or approved for treating a specific patient. [employer], [employer] and its affiliates disclaim any warranty or liability relating to this information or the use thereof. The use of this information is governed by the Terms of Use, available at https://www.[employer].com/en/know/clinical-effectiveness-terms   Copyright   Copyright © 2024 [employer], [employer]. and its affiliates and/or licensors. All rights reserved.

## 2024-08-14 ENCOUNTER — PROCEDURE VISIT (OUTPATIENT)
Dept: OBGYN CLINIC | Facility: CLINIC | Age: 64
End: 2024-08-14
Payer: COMMERCIAL

## 2024-08-14 VITALS
TEMPERATURE: 97.7 F | BODY MASS INDEX: 38.83 KG/M2 | DIASTOLIC BLOOD PRESSURE: 80 MMHG | HEIGHT: 62 IN | WEIGHT: 211 LBS | SYSTOLIC BLOOD PRESSURE: 154 MMHG | OXYGEN SATURATION: 96 % | HEART RATE: 68 BPM

## 2024-08-14 DIAGNOSIS — M17.11 ARTHRITIS OF RIGHT KNEE: ICD-10-CM

## 2024-08-14 DIAGNOSIS — Z98.890 HISTORY OF MEDIAL MENISCUS REPAIR OF RIGHT KNEE: Primary | ICD-10-CM

## 2024-08-14 DIAGNOSIS — G89.29 CHRONIC PAIN OF RIGHT KNEE: ICD-10-CM

## 2024-08-14 DIAGNOSIS — M25.561 CHRONIC PAIN OF RIGHT KNEE: ICD-10-CM

## 2024-08-14 PROCEDURE — 20610 DRAIN/INJ JOINT/BURSA W/O US: CPT | Performed by: STUDENT IN AN ORGANIZED HEALTH CARE EDUCATION/TRAINING PROGRAM

## 2024-08-14 NOTE — PATIENT INSTRUCTIONS
Patient Education     Viscosupplementation   Why is this procedure done?   Your joints are where two bones meet. The ends of the bones are covered with a protective coating of cartilage. This helps them glide smoothly during movement. A fluid also helps the joint move more smoothly. With years of use, the cartilage may begin to wear away. This causes pain in the joints. This procedure is done to relieve the pain. A special fluid is used to help the bones glide more easily. It also acts like a shock absorber. This is most often done on the knee joints.  What will the results be?   Lubricates the joint  Less pain in the joints during movement or weight bearing  Improved joint mobility or movement  What happens before the procedure?   Your doctor may ask you to try other ways to treat osteoarthritis or OA, such as exercise, physical therapy, drugs for pain, applying hot compress, and losing weight.  Talk to your doctor about all the drugs you are taking. Be sure to include all prescription and over-the-counter (OTC) drugs, and herbal supplements. Tell the doctor about any drug allergy. Bring a list of drugs you take with you. Some drugs may interact with the injection.  What happens during the procedure?   Your doctor will clean the skin area where the injection will be given. You will be given a drug called local anesthesia. This will numb your skin and you will not feel any pain.  In some cases, your doctor might use imaging like x-ray or ultrasound to make sure they inject the right spot.  If you have excess fluid in your joint, your doctor may remove the fluid before beginning.  A needle will be used to inject the hyaluronic acid into the joint. Hyaluronic acid is a natural fluid in your body. It lubricates the joint to ease body movements.  The doctor will cover the injection site with a small bandage to prevent infection.  The procedure may only take a couple of minutes.  What happens after the procedure?   You  may feel slight pain, warmth, and swelling around the joint area.  You will be able to go home after the injection.  What care is needed at home?   Ask your doctor what you need to do when you go home. Make sure you ask questions if you do not understand what the doctor says. This way you will know what you need to do.  Place an ice pack or a bag of frozen peas wrapped in a towel over the painful part. Never put ice right on the skin. Do not leave the ice on more than 10 to 15 minutes at a time.  Rest for the first few days after the procedure. Avoid strenuous activities like heavy lifting, jogging, standing for a long time, and hard exercise.  What follow-up care is needed?   Your doctor may ask you to make visits to the office to check on your progress. Be sure to keep these visits. Your doctor may want you to have more injections.  What lifestyle changes are needed?   Ask your doctor about when you can go back to your normal activities like exercise or work.  What problems could happen?   Pain, redness, and swelling around the injection site  Infection of the joint  Fever  Allergic reaction  Itching  Rash  Bruising  Bleeding in the joint  No change in pain after injection  Last Reviewed Date   2021-03-30  Consumer Information Use and Disclaimer   This generalized information is a limited summary of diagnosis, treatment, and/or medication information. It is not meant to be comprehensive and should be used as a tool to help the user understand and/or assess potential diagnostic and treatment options. It does NOT include all information about conditions, treatments, medications, side effects, or risks that may apply to a specific patient. It is not intended to be medical advice or a substitute for the medical advice, diagnosis, or treatment of a health care provider based on the health care provider's examination and assessment of a patient’s specific and unique circumstances. Patients must speak with a health care  provider for complete information about their health, medical questions, and treatment options, including any risks or benefits regarding use of medications. This information does not endorse any treatments or medications as safe, effective, or approved for treating a specific patient. UpToDate, Inc. and its affiliates disclaim any warranty or liability relating to this information or the use thereof. The use of this information is governed by the Terms of Use, available at https://www.woltersVISupuwer.com/en/know/clinical-effectiveness-terms   Copyright   Copyright © 2024 UpToDate, Inc. and its affiliates and/or licensors. All rights reserved.

## 2024-08-14 NOTE — PROGRESS NOTES
1. History of medial meniscus repair of right knee  Large joint arthrocentesis: R knee      2. Arthritis of right knee  Large joint arthrocentesis: R knee      3. Chronic pain of right knee  Large joint arthrocentesis: R knee              Large joint arthrocentesis: R knee  [place] Protocol:  procedure performed by consultantConsent: Verbal consent obtained.  Risks and benefits: risks, benefits and alternatives were discussed  Consent given by: patient  Patient understanding: patient states understanding of the procedure being performed  Site marked: the operative site was marked  Radiology Images displayed and confirmed. If images not available, report reviewed: imaging studies available  Patient identity confirmed: verbally with patient  Supporting Documentation  Indications: pain   Procedure Details  Location: knee - R knee  Preparation: Patient was prepped and draped in the usual sterile fashion  Needle size: 22 G  Ultrasound guidance: no  Approach: anterolateral  Medications administered: 2 mL sodium hyaluronate 16.8 MG/2ML    Patient tolerance: patient tolerated the procedure well with no immediate complications  Dressing:  Sterile dressing applied        Patient reports improving right knee pain and stability since last visit  F/u 6 months to discuss repeating visco injection

## 2024-08-19 DIAGNOSIS — F41.1 GAD (GENERALIZED ANXIETY DISORDER): ICD-10-CM

## 2024-08-19 DIAGNOSIS — I10 ESSENTIAL HYPERTENSION: ICD-10-CM

## 2024-08-19 NOTE — TELEPHONE ENCOUNTER
Reason for call:   [x] Refill   [] Prior Auth  [] Other:     Office:   [x] PCP/Provider -   [] Specialty/Provider -     Medication: Buspar 7.5 mg 1 tablet 2 times daily       [facility]: [facility]     Does the patient have enough for 3 days?   [x] Yes   [] No - Send as HP to POD

## 2024-08-20 RX ORDER — FUROSEMIDE 40 MG
40 TABLET ORAL 2 TIMES DAILY
Qty: 180 TABLET | Refills: 1 | Status: SHIPPED | OUTPATIENT
Start: 2024-08-20

## 2024-08-20 RX ORDER — BUSPIRONE HYDROCHLORIDE 7.5 MG/1
7.5 TABLET ORAL 2 TIMES DAILY
Qty: 60 TABLET | Refills: 5 | Status: SHIPPED | OUTPATIENT
Start: 2024-08-20

## 2024-08-30 DIAGNOSIS — I48.0 PAROXYSMAL ATRIAL FIBRILLATION (HCC): ICD-10-CM

## 2024-08-30 DIAGNOSIS — I51.3 MURAL THROMBUS OF CARDIAC APEX: ICD-10-CM

## 2024-08-30 RX ORDER — APIXABAN 5 MG/1
5 TABLET, FILM COATED ORAL 2 TIMES DAILY
Qty: 60 TABLET | Refills: 5 | Status: SHIPPED | OUTPATIENT
Start: 2024-08-30

## 2024-09-05 DIAGNOSIS — I48.0 PAROXYSMAL ATRIAL FIBRILLATION (HCC): ICD-10-CM

## 2024-09-05 DIAGNOSIS — I51.3 MURAL THROMBUS OF CARDIAC APEX: ICD-10-CM

## 2024-09-05 NOTE — TELEPHONE ENCOUNTER
Patient walked in requesting medication refills, patient will like to see if you can change the RX to just receive one bottle a month rather then 4 bottles a month for potassium 20mg. Please advise.

## 2024-09-06 DIAGNOSIS — E87.6 HYPOKALEMIA: Primary | ICD-10-CM

## 2024-09-06 RX ORDER — POTASSIUM CHLORIDE 1500 MG/1
40 TABLET, EXTENDED RELEASE ORAL DAILY
Qty: 60 TABLET | Refills: 1 | Status: SHIPPED | OUTPATIENT
Start: 2024-09-06

## 2024-09-06 RX ORDER — POTASSIUM CHLORIDE 1500 MG/1
40 TABLET, EXTENDED RELEASE ORAL DAILY
OUTPATIENT
Start: 2024-09-06

## 2024-09-20 ENCOUNTER — APPOINTMENT (OUTPATIENT)
Dept: LAB | Facility: CLINIC | Age: 64
End: 2024-09-20
Payer: COMMERCIAL

## 2024-09-20 DIAGNOSIS — I10 ESSENTIAL HYPERTENSION: ICD-10-CM

## 2024-09-20 LAB
ANION GAP SERPL CALCULATED.3IONS-SCNC: 9 MMOL/L (ref 4–13)
BUN SERPL-MCNC: 11 MG/DL (ref 5–25)
CALCIUM SERPL-MCNC: 8.6 MG/DL (ref 8.4–10.2)
CHLORIDE SERPL-SCNC: 105 MMOL/L (ref 96–108)
CO2 SERPL-SCNC: 30 MMOL/L (ref 21–32)
CREAT SERPL-MCNC: 0.58 MG/DL (ref 0.6–1.3)
GFR SERPL CREATININE-BSD FRML MDRD: 99 ML/MIN/1.73SQ M
GLUCOSE P FAST SERPL-MCNC: 90 MG/DL (ref 65–99)
POTASSIUM SERPL-SCNC: 3.5 MMOL/L (ref 3.5–5.3)
SODIUM SERPL-SCNC: 144 MMOL/L (ref 135–147)

## 2024-09-20 PROCEDURE — 80048 BASIC METABOLIC PNL TOTAL CA: CPT

## 2024-09-20 PROCEDURE — 36415 COLL VENOUS BLD VENIPUNCTURE: CPT

## 2024-09-29 ENCOUNTER — OFFICE VISIT (OUTPATIENT)
Dept: URGENT CARE | Facility: CLINIC | Age: 64
End: 2024-09-29
Payer: COMMERCIAL

## 2024-09-29 VITALS
RESPIRATION RATE: 20 BRPM | WEIGHT: 210 LBS | BODY MASS INDEX: 38.64 KG/M2 | OXYGEN SATURATION: 98 % | HEIGHT: 62 IN | DIASTOLIC BLOOD PRESSURE: 76 MMHG | SYSTOLIC BLOOD PRESSURE: 142 MMHG | TEMPERATURE: 98.5 F | HEART RATE: 71 BPM

## 2024-09-29 DIAGNOSIS — J20.9 ACUTE BRONCHITIS, UNSPECIFIED ORGANISM: Primary | ICD-10-CM

## 2024-09-29 PROCEDURE — S9088 SERVICES PROVIDED IN URGENT: HCPCS

## 2024-09-29 PROCEDURE — 99213 OFFICE O/P EST LOW 20 MIN: CPT

## 2024-09-29 RX ORDER — ALBUTEROL SULFATE 90 UG/1
2 INHALANT RESPIRATORY (INHALATION) EVERY 6 HOURS PRN
Qty: 8.5 G | Refills: 0 | Status: SHIPPED | OUTPATIENT
Start: 2024-09-29

## 2024-09-29 NOTE — PROGRESS NOTES
[facility]        NAME: Akiko Kang is a 64 y.o. female  : 1960    MRN: [id number]  DATE: 2024  TIME: 9:15 AM    Assessment and Plan   Acute bronchitis, unspecified organism [J20.9]  1. Acute bronchitis, unspecified organism  amoxicillin-clavulanate (AUGMENTIN) 875-125 mg per tablet    albuterol (ProAir HFA) 90 mcg/act inhaler            Patient Instructions     Take antibiotic as prescribed  Albuterol inhaler as needed for shortness of breath or wheezing  Plenty of fluids  Can use honey   Cool mist humidifier   Warm gargle with salt water for sore throat   Use Tylenol/ibuprofen as needed for fever or pain    Follow up with PCP in 3-5 days.  Proceed to  ER if symptoms worsen.    If tests are performed, our office will contact you with results only if changes need to made to the care plan discussed with you at the visit. [facility]    Chief Complaint     Chief Complaint   Patient presents with    Cold Like Symptoms     C/o nasal congestion, cough, chest congestion with tightness. Onset 7 days ago and worsening.Taking chlorecedin without relief.         History of Present Illness       URI   This is a new problem. Episode onset: 7 days. The problem has been gradually worsening. There has been no fever. Associated symptoms include congestion and coughing (slight production). Pertinent negatives include no chest pain, ear pain, rhinorrhea, sore throat or wheezing. Treatments tried: Coricidin.       Review of Systems   Review of Systems   Constitutional:  Negative for chills, diaphoresis, fatigue and fever.   HENT:  Positive for congestion. Negative for ear pain, postnasal drip, rhinorrhea, sinus pressure, sore throat and trouble swallowing.    Respiratory:  Positive for cough (slight production) and chest tightness. Negative for shortness of breath and wheezing.    Cardiovascular:  Negative for chest pain and palpitations.   Skin:  Negative  for color change.   Neurological:  Negative for dizziness and light-headedness.   Psychiatric/Behavioral:  Negative for sleep disturbance.          Current Medications       Current Outpatient Medications:     albuterol (ProAir HFA) 90 mcg/act inhaler, Inhale 2 puffs every 6 (six) hours as needed for wheezing or shortness of breath, Disp: 8.5 g, Rfl: 0    albuterol (PROVENTIL HFA,VENTOLIN HFA) 90 mcg/act inhaler, Inhale 2 puffs every 6 (six) hours as needed for wheezing, Disp: 18 g, Rfl: 1    amoxicillin-clavulanate (AUGMENTIN) 875-125 mg per tablet, Take 1 tablet by mouth every 12 (twelve) hours for 7 days, Disp: 14 tablet, Rfl: 0    apixaban (Eliquis) 5 mg, take 1 tablet by mouth twice a day, Disp: 60 tablet, Rfl: 5    aspirin 81 mg chewable tablet, Chew 81 mg daily, Disp: , Rfl:     atorvastatin (LIPITOR) 40 mg tablet, Take 1 tablet (40 mg total) by mouth every evening, Disp: 90 tablet, Rfl: 3    busPIRone (BUSPAR) 7.5 mg tablet, Take 1 tablet (7.5 mg total) by mouth 2 (two) times a day, Disp: 60 tablet, Rfl: 5    furosemide (LASIX) 40 mg tablet, take 1 tablet by mouth twice a day, Disp: 180 tablet, Rfl: 1    losartan (COZAAR) 50 mg tablet, Take 1 tablet (50 mg total) by mouth daily, Disp: 90 tablet, Rfl: 3    metoprolol succinate (TOPROL-XL) 25 mg 24 hr tablet, Take 1 tablet (25 mg total) by mouth 2 (two) times a day, Disp: 60 tablet, Rfl: 5    Multiple Vitamin (One-Daily Multi-Vitamin) TABS, Take by mouth, Disp: , Rfl:     potassium chloride (Klor-Con M20) 20 mEq tablet, Take 2 tablets (40 mEq total) by mouth daily, Disp: 60 tablet, Rfl: 1    Specialty Vitamins Products (Advanced Collagen) TABS, Take by mouth, Disp: , Rfl:     Current Allergies     Allergies as of 09/29/2024 - Reviewed 09/29/2024   Allergen Reaction Noted    Ace inhibitors Cough 06/10/2020            The following portions of the patient's history were reviewed and updated as appropriate: allergies, current medications, past family history,  "past medical history, past social history, past surgical history and problem list.     Past Medical History:   Diagnosis Date    Allergic     Arthritis     Atrial fibrillation (HCC)     CHF (congestive heart failure) (HCC)     COPD (chronic obstructive pulmonary disease) (HCC)     Coronary artery disease     Diverticulitis of colon     Headache(784.0)     Hypertension     Shingles     Sleep apnea        Past Surgical History:   Procedure Laterality Date    AV NODE ABLATION      CARDIAC SURGERY  2023     SECTION  ,     KNEE SURGERY         Family History   Problem Relation Age of Onset    Hyperlipidemia Mother     Hypertension Mother     Diverticulitis Mother     Hypertension Father     Hyperlipidemia Father     Heart disease Father     Heart attack Father 57    No Known Problems Sister     No Known Problems Sister     No Known Problems Daughter     No Known Problems Daughter     No Known Problems Maternal Aunt     No Known Problems Paternal Aunt     No Known Problems Paternal Aunt     No Known Problems Maternal Grandmother     No Known Problems Maternal Grandfather     No Known Problems Paternal Grandmother     No Known Problems Paternal Grandfather     Breast cancer Cousin          Medications have been verified.        Objective   /76   Pulse 71   Temp 98.5 °F (36.9 °C)   Resp 20   Ht 5' 2\" (1.575 m)   Wt 95.3 kg (210 lb)   SpO2 98%   BMI 38.41 kg/m²        Physical Exam     Physical Exam  Constitutional:       General: She is not in acute distress.     Appearance: Normal appearance. She is not ill-appearing.   HENT:      Head: Normocephalic.      Nose: No congestion.      Mouth/Throat:      Mouth: Mucous membranes are moist.      Pharynx: Oropharynx is clear.   Cardiovascular:      Rate and Rhythm: Normal rate and regular rhythm.      Pulses: Normal pulses.      Heart sounds: Normal heart sounds.   Pulmonary:      Effort: Pulmonary effort is normal. No respiratory distress.      " Breath sounds: No stridor. Wheezing (slight wheezing more audible on R side) present. No rhonchi or rales.   Lymphadenopathy:      Cervical: No cervical adenopathy.   Skin:     General: Skin is warm and dry.   Neurological:      General: No focal deficit present.      Mental Status: She is alert and oriented to person, place, and time. Mental status is at baseline.   Psychiatric:         Mood and Affect: Mood normal.         Behavior: Behavior normal.         Thought Content: Thought content normal.         Judgment: Judgment normal.

## 2024-09-29 NOTE — PATIENT INSTRUCTIONS
Take antibiotic as prescribed  Albuterol inhaler as needed for shortness of breath or wheezing  Plenty of fluids  Can use honey   Cool mist humidifier   Warm gargle with salt water for sore throat   Use Tylenol/ibuprofen as needed for fever or pain    Follow up with PCP in 3-5 days.  Proceed to  ER if symptoms worsen.    If tests are performed, our office will contact you with results only if changes need to made to the care plan discussed with you at the visit. You can review your full results on [facility].

## 2024-09-29 NOTE — LETTER
September 29, 2024     Patient: Akiko Kang   YOB: 1960   Date of Visit: 9/29/2024       To Whom It May Concern:    It is my medical opinion that Akiko Kang may return to work on 9/30/2024 .    If you have any questions or concerns, please don't hesitate to call.         Sincerely,        Kj Montejo PA-C    CC: No Recipients

## 2024-09-30 ENCOUNTER — OFFICE VISIT (OUTPATIENT)
Dept: FAMILY MEDICINE CLINIC | Facility: CLINIC | Age: 64
End: 2024-09-30
Payer: COMMERCIAL

## 2024-09-30 VITALS
HEART RATE: 67 BPM | DIASTOLIC BLOOD PRESSURE: 82 MMHG | OXYGEN SATURATION: 98 % | BODY MASS INDEX: 39.64 KG/M2 | SYSTOLIC BLOOD PRESSURE: 122 MMHG | TEMPERATURE: 98.9 F | WEIGHT: 216.71 LBS

## 2024-09-30 DIAGNOSIS — F17.290 VAPING NICOTINE DEPENDENCE, TOBACCO PRODUCT: ICD-10-CM

## 2024-09-30 DIAGNOSIS — J20.9 ACUTE BRONCHITIS, UNSPECIFIED ORGANISM: Primary | ICD-10-CM

## 2024-09-30 DIAGNOSIS — E87.6 HYPOKALEMIA: ICD-10-CM

## 2024-09-30 DIAGNOSIS — J45.20 MILD INTERMITTENT ASTHMA WITHOUT COMPLICATION: ICD-10-CM

## 2024-09-30 DIAGNOSIS — I10 ESSENTIAL HYPERTENSION: ICD-10-CM

## 2024-09-30 DIAGNOSIS — Z79.01 CURRENT USE OF LONG TERM ANTICOAGULATION: ICD-10-CM

## 2024-09-30 PROCEDURE — 99214 OFFICE O/P EST MOD 30 MIN: CPT | Performed by: PHYSICIAN ASSISTANT

## 2024-09-30 NOTE — LETTER
September 30, 2024     Patient: Akiko Kang  YOB: 1960  Date of Visit: 9/30/2024      To Whom it May Concern:    Akiko Kang is under my professional care. Akiko was seen in my office on 9/30/2024. Akiko may return to work on October 7, 2024 .    If you have any questions or concerns, please don't hesitate to call.         Sincerely,          Renee Talamantes PA-C        CC: No Recipients

## 2024-09-30 NOTE — PROGRESS NOTES
Assessment/Plan:       1. Acute bronchitis, unspecified organism  2. Hypokalemia  3. Essential hypertension  4. Mild intermittent asthma without complication  5. Vaping nicotine dependence, tobacco product  6. Current use of long term anticoagulation      This 64-year-old female sees me for her primary care services.  Patient was seen in urgent care yesterday due to coughing chest tightness and feeling as if she was getting pneumonia once again.  She has previously had significant pneumonia in the past at this time of year.    Patient continues to vape but states that this may be the end of her vaping as she has been decreasing it and with her being sick, she has not been able to teri.  She was not interested in any of the medication assistance as she states that they did not work for her.    She is seeing a cardiologist next month locally.  She would like her entire cardiovascular system evaluated.  She has a history of hypertension and she is well-controlled on losartan.  She is on long-term Lasix therapy that was started when she was hospitalized.  She is on potassium replacement.  She recently had a BMP done and her potassium level is now acceptable at 3.5.    Patient's asthma tends to flare when she gets upper respiratory infections but in between times, she is fairly asymptomatic.  She is having a lot of bruising secondary to the Eliquis therapy that she is on on a long-term basis.  She continues to see Dr. Muniz for her knee and shoulder issues.    A total of 30 minutes was spent rendering care for this patient.  This time included review of the patient's electronic medical record, performing the history and physical, reviewing appropriate labs and/or images, developing a treatment and assessment plan, answering patient's questions and concerns, and documenting the patient visit.  I will see the patient in 1 month for a repeat Pap smear.  Subjective:      Patient ID: Akiko Kang is a 64 y.o. female.    HPI:  Well-developed well-nourished 64-year-old female who was alert oriented and cooperative with the exam.  She has a nontoxic appearance.  She states that she is having a lot of coughing.  No coughing was noted today.  She is employed at the [employer] in the [facility].  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  I have written her a note for being off the rest of this week.    She is not having pain in her chest or heart palpitations.  She was recently with a granddaughter who had URI symptoms and then she developed her own URI symptoms 2 days after her granddaughter.    Patient's initial blood pressure was elevated and repeat blood pressure was well-controlled.  She is adherent with her medications.    The following portions of the patient's history were reviewed and updated as appropriate: allergies, current medications, past family history, past medical history, past social history, past surgical history, and problem list.    Review of Systems : While at work, a recent coworker tested positive for COVID but continues to work.  Patient is not having loss of taste or GI complaints at this time.    Objective:      /82 (BP Location: Right arm, Patient Position: Sitting, Cuff Size: Standard)   Pulse 67   Temp 98.9 °F (37.2 °C) (Tympanic)   Wt 98.3 kg (216 lb 11.4 oz)   SpO2 98%   BMI 39.64 kg/m²          Physical Exam reviewed vital signs.  Temp is 98.9 blood pressure is well-controlled.    Heart is regular rate without murmur rub or gallops.  Lungs are clear to auscultation.  No egophony changes.  Aerating bases well.  No wheezing appreciated.  Able to speak in full sentences without any respiratory distress.

## 2024-11-11 DIAGNOSIS — I10 PRIMARY HYPERTENSION: ICD-10-CM

## 2024-11-12 RX ORDER — METOPROLOL SUCCINATE 25 MG/1
25 TABLET, EXTENDED RELEASE ORAL 2 TIMES DAILY
Qty: 60 TABLET | Refills: 5 | Status: SHIPPED | OUTPATIENT
Start: 2024-11-12

## 2024-11-22 ENCOUNTER — OFFICE VISIT (OUTPATIENT)
Dept: OBGYN CLINIC | Facility: CLINIC | Age: 64
End: 2024-11-22
Payer: COMMERCIAL

## 2024-11-22 VITALS
WEIGHT: 219 LBS | SYSTOLIC BLOOD PRESSURE: 128 MMHG | HEIGHT: 62 IN | HEART RATE: 73 BPM | OXYGEN SATURATION: 97 % | DIASTOLIC BLOOD PRESSURE: 76 MMHG | BODY MASS INDEX: 40.3 KG/M2 | TEMPERATURE: 98.4 F

## 2024-11-22 DIAGNOSIS — R20.2 NUMBNESS AND TINGLING IN BOTH HANDS: ICD-10-CM

## 2024-11-22 DIAGNOSIS — M25.511 ACUTE PAIN OF RIGHT SHOULDER: ICD-10-CM

## 2024-11-22 DIAGNOSIS — R20.0 NUMBNESS AND TINGLING IN BOTH HANDS: ICD-10-CM

## 2024-11-22 DIAGNOSIS — M19.011 PRIMARY OSTEOARTHRITIS OF RIGHT SHOULDER: ICD-10-CM

## 2024-11-22 DIAGNOSIS — M75.41 IMPINGEMENT SYNDROME OF RIGHT SHOULDER: Primary | ICD-10-CM

## 2024-11-22 PROCEDURE — 99213 OFFICE O/P EST LOW 20 MIN: CPT | Performed by: STUDENT IN AN ORGANIZED HEALTH CARE EDUCATION/TRAINING PROGRAM

## 2024-11-22 PROCEDURE — 20610 DRAIN/INJ JOINT/BURSA W/O US: CPT | Performed by: STUDENT IN AN ORGANIZED HEALTH CARE EDUCATION/TRAINING PROGRAM

## 2024-11-22 RX ORDER — TRIAMCINOLONE ACETONIDE 40 MG/ML
40 INJECTION, SUSPENSION INTRA-ARTICULAR; INTRAMUSCULAR
Status: COMPLETED | OUTPATIENT
Start: 2024-11-22 | End: 2024-11-22

## 2024-11-22 RX ORDER — BUPIVACAINE HYDROCHLORIDE 2.5 MG/ML
2 INJECTION, SOLUTION INFILTRATION; PERINEURAL
Status: COMPLETED | OUTPATIENT
Start: 2024-11-22 | End: 2024-11-22

## 2024-11-22 RX ADMIN — TRIAMCINOLONE ACETONIDE 40 MG: 40 INJECTION, SUSPENSION INTRA-ARTICULAR; INTRAMUSCULAR at 09:30

## 2024-11-22 RX ADMIN — BUPIVACAINE HYDROCHLORIDE 2 ML: 2.5 INJECTION, SOLUTION INFILTRATION; PERINEURAL at 09:30

## 2024-11-22 NOTE — PATIENT INSTRUCTIONS
"Patient Education     Steroid injection   The Basics   Written by the doctors and editors at [facility]   What is a steroid injection? -- Steroids, also known as \"glucocorticoids,\" are medicines that help reduce swelling and pain. Doctors sometimes inject a steroid medicine into a joint or other part of the body to relieve pain. This is also sometimes called a \"cortisone shot.\"  After the injection, the steroid starts to work within a few days.  How long does a steroid injection work? -- It depends on the person and where the injection is given. For some people, the effects of a steroid injection can last for a few weeks or longer.  Sometimes, the doctor also injects a medicine called a \"local anesthetic\" with the steroid. This might help relieve pain until the steroid starts to work.  A steroid injection can help with pain, but it won't cure the problem that is causing the pain.  How do I prepare for a steroid injection? -- The doctor or nurse will tell you if you need to do anything special to prepare. Before your procedure, your doctor will do an exam.  Your doctor will also ask you about your \"health history.\" This involves asking you questions about any health problems you have or had in the past, past surgeries, and any medicines you take. Tell them about:   Any medicines you are taking - This includes any prescription or \"over-the-counter\" medicines you use, plus any herbal supplements you take. It helps to write down and bring a list of any medicines you take, or bring a bag with all of your medicines with you.   Any allergies you have   Any bleeding problems you have   Any other steroid injections you have had  Ask the doctor or nurse if you have questions or if there is anything you do not understand.  How is a steroid injection given? -- When it is time for the injection:   The doctor will clean the skin over the area where they plan to give the shot. (This is called the \"injection site.\")   They might use " ultrasound or a special kind of X-ray during the procedure. This is to check where to give the steroid.   Sometimes, they might give a shot of medicine to numb the skin.   They will inject the steroid.   Then, they will take out the needle and cover the injection site with a bandage.  What happens after a steroid injection? -- You can go home after the injection.  For the next few days, you might want to:   Apply a cold gel pack, bag of ice, or bag of frozen vegetables to the injection site every 1 to 2 hours, for 15 minutes each time. Put a thin towel between the ice (or other cold object) and your skin.   Rest the treated body part for a few days.   Take medicines to relieve pain. Examples include acetaminophen (sample brand name: Tylenol), ibuprofen (sample brand names: Advil, Motrin), or naproxen (sample brand name: Aleve).  If you have diabetes, the doctor might want you to check your blood sugar levels more often for a few days. The steroid medicine might temporarily raise your blood sugar.  What are the risks of a steroid injection? -- Your doctor will talk to you about all of the possible risks, and answer your questions. Possible risks include:   Bleeding, bruising, or soreness at the injection site   Damage to parts near the injection site - This might include cartilage damage, injury to nerves, tendon rupture, or thinning of skin and bones.   Skin around the injection site becoming lighter or whiter in color   Infection   Health conditions like diabetes or high blood pressure getting worse for a few days   Allergic reaction to the medicine  What else should I know? -- Most of the time, doctors limit the total number of steroid injections to a certain area.  A steroid injection might be only 1 part of your treatment plan. Take your other medicines as instructed. Also, follow your doctor's recommendations about other treatments. These might include things like physical therapy or devices like a brace or  cane.  All topics are updated as new evidence becomes available and our peer review process is complete.  This topic retrieved from [employer] on: Apr 25, 2024.  Topic 899868 Version 2.0  Release: 32.4.2 - C32.114  © 2024 [employer] and/or its affiliates. All rights reserved.  Consumer Information Use and Disclaimer   Disclaimer: This generalized information is a limited summary of diagnosis, treatment, and/or medication information. It is not meant to be comprehensive and should be used as a tool to help the user understand and/or assess potential diagnostic and treatment options. It does NOT include all information about conditions, treatments, medications, side effects, or risks that may apply to a specific patient. It is not intended to be medical advice or a substitute for the medical advice, diagnosis, or treatment of a health care provider based on the health care provider's examination and assessment of a patient's specific and unique circumstances. Patients must speak with a health care provider for complete information about their health, medical questions, and treatment options, including any risks or benefits regarding use of medications. This information does not endorse any treatments or medications as safe, effective, or approved for treating a specific patient. UpToDate, Inc. and its affiliates disclaim any warranty or liability relating to this information or the use thereof.The use of this information is governed by the Terms of Use, available at https://www.woltersMedstoryuwer.com/en/know/clinical-effectiveness-terms. 2024© UpToDate, Inc. and its affiliates and/or licensors. All rights reserved.  Copyright   © 2024 UpToDate, Inc. and/or its affiliates. All rights reserved.

## 2024-11-22 NOTE — PROGRESS NOTES
1. Impingement syndrome of right shoulder  Large joint arthrocentesis: R subacromial bursa      2. Acute pain of right shoulder  Large joint arthrocentesis: R subacromial bursa      3. Primary osteoarthritis of right shoulder  Large joint arthrocentesis: R subacromial bursa      4. Numbness and tingling in both hands          Orders Placed This Encounter   Procedures    Large joint arthrocentesis: R subacromial bursa        Imaging Studies (I personally reviewed images in PACS and report):    X-ray right shoulder 3/15/2024: No acute osseous abnormalities.  Severe/prominent osteoarthritis of the AC joint with superior and inferior spurring.      IMPRESSION:  Acute atraumatic right shoulder pain/stiffness-aggravated from overuse  Prior relief from subacromial cortisone injections in the past and was last performed in March 2024.  Ongoing issue for the past couple weeks  Prior imaging of her shoulder showing AC joint arthritis  Suspected impingement syndrome versus AC joint arthritic pain.  However nontender over AC joint today    Separately at the end of visit patient is also mentioning a numbness and tingling along the tips of her fingers bilaterally.  Numbness worsens with lying on either right or left shoulder and with use.    Other factors:  Right-hand-dominant and works in an occupation at the [employer] where she does repetitive range of motion movement of her RUE    PLAN:    Clinical exam and radiographic imaging reviewed with patient today, with impression as per above. I have discussed with the patient the pathophysiology of this diagnosis and reviewed how the examination correlates with this diagnosis.    Treatment options were discussed including risk/benefits and patient was agreeable to a right subacromial cortisone injection as per procedure note below.  Counseled we can repeated every 3 months as needed in regards to pain control.  I did not feel his AC joint arthritis was the site of pain  "today.  Furthermore, MRI was for separate issue of numbness and tingling of her bilateral hands, I counseled we could consider obtaining an EMG of her upper extremities for further evaluation.  Patient states she would like to consider this option in the future and will reach out if she is decides to pursue.  She states she would want to talk with her cardiologist first to determine if there is a blood flow issue causing her numbness and tingling.    Return if symptoms worsen or fail to improve.    Portions of the record may have been created with voice recognition software. Occasional wrong word or \"sound a like\" substitutions may have occurred due to the inherent limitations of voice recognition software. Read the chart carefully and recognize, using context, where substitutions have occurred.     CHIEF COMPLAINT:  Chief Complaint   Patient presents with    Right Shoulder - Follow-up     Pt states she is here for shot in shoulder         HPI:  Akiko Kang is a 64 y.o. female  who presents for     Visit 11/22/2024:  Follow-up evaluation of right shoulder pain  Of note patient underwent subacromial cortisone injection of the shoulder by me in March 2024.  Patient reports significant improvement of her shoulder pain from this injection.  She states she has been doing well until recently about 2 weeks ago.  She denies any precipitating injury she can recall and thinks it is just her occupation that involves repetitive motion.  Works in the [employer] where she does repetitive movement of her shoulder.  She describes a sharp/aching pain that is worse with range of motion movements.  She reports crepitus of her shoulder.  Denies any shoulder swelling or discoloration.    Patient also reports separately that she has been experiencing numbness of her bilateral hands.  She states typically at baseline she has tingling sensation along the tips of her fingers.  She states the numbness can radiate further through her arm.  She " states with use of her arms this can occur and she especially notices it when she lies on her right shoulder which affects her right upper extremity or if she lies on her left shoulder affects her left upper extremity.  Denies undergoing an EMG of her upper extremities but states she has had this procedure for her lower extremities in the past.  Denies any discoloration/pallor of her hands with numbness occurs.  Denies any pattern of aggravation in cold weather.    Visit 3/15/2024 :  Follow up evaluation of right shoulder pain:  Patient has been seen in the past in regards to impingement syndrome of her right shoulder which she underwent a subacromial cortisone injection with significant relief  She reports today that she has been doing well until about 2 or 3 weeks ago.  Denies any new injury but does attribute her occupation in a [employer] where she is repetitively using her arms is a contributing factor.  She feels that the pain is similar to her impingement pain in the past and that it is over the dorsal posterior aspect of her shoulder.  Describes it as an aching pain of mild to moderate intensity that worsens with repetitive use of her right upper extremity.  Reports crepitus with range of motion movements and slightly decreased range of motion.  Reports her strength is intact despite the pain.  Denies shoulder swelling, discoloration.  She is interested in whether she can have a repeat subacromial cortisone injection today.    Furthermore, patient also had other complaints today of right knee pain.  She states she has a history of a right meniscus tear status post surgery from years ago.  She also states undergoing an injection of her knee with cortisone with relief with this about 2 years ago.  Due to time constraints, I recommended we make a follow-up visit within the next 1 to 2 weeks to discuss this issue while we manage her right shoulder pain.      Medical, Surgical, Family, and Social History    Past  "Medical History:   Diagnosis Date    Allergic     Arthritis     Atrial fibrillation (HCC)     CHF (congestive heart failure) (HCC)     COPD (chronic obstructive pulmonary disease) (HCC)     Coronary artery disease     Diverticulitis of colon     Headache(784.0)     Hypertension     Shingles     Sleep apnea      Past Surgical History:   Procedure Laterality Date    AV NODE ABLATION      CARDIAC SURGERY  2023     SECTION  ,     KNEE SURGERY       Social History   Social History     Substance and Sexual Activity   Alcohol Use Yes    Comment: occassionally     Social History     Substance and Sexual Activity   Drug Use Never     Social History     Tobacco Use   Smoking Status Some Days    Current packs/day: 0.00    Types: Cigarettes    Last attempt to quit: 2019    Years since quittin.8    Passive exposure: Never   Smokeless Tobacco Never     Family History   Problem Relation Age of Onset    Hyperlipidemia Mother     Hypertension Mother     Diverticulitis Mother     Hypertension Father     Hyperlipidemia Father     Heart disease Father     Heart attack Father 57    No Known Problems Sister     No Known Problems Sister     No Known Problems Daughter     No Known Problems Daughter     No Known Problems Maternal Aunt     No Known Problems Paternal Aunt     No Known Problems Paternal Aunt     No Known Problems Maternal Grandmother     No Known Problems Maternal Grandfather     No Known Problems Paternal Grandmother     No Known Problems Paternal Grandfather     Breast cancer Cousin      Allergies   Allergen Reactions    Ace Inhibitors Cough          Physical Exam  /76 (BP Location: Left arm, Patient Position: Sitting, Cuff Size: Large)   Pulse 73   Temp 98.4 °F (36.9 °C)   Ht 5' 2\" (1.575 m)   Wt 99.3 kg (219 lb)   SpO2 97%   BMI 40.06 kg/m²     Constitutional:  see vital signs  Gen: obese, normocephalic/atraumatic, well-groomed  Eyes: No inflammation or discharge of conjunctiva or " lids; sclera clear   Pharynx: no inflammation, lesion, or mass of lips  Pulmonary/Chest: Effort normal. No respiratory distress.     Ortho Exam  Shoulder exam:       RIGHT    Inspection Erythema None     Swelling None     Increased Warmth None    Rotator cuff ER 5/5     IR 5/5     Abduction 5/5    ROM      Abduction 170     ER0 60     IRb T7    TTP:  +posterolateral aspect over supraspinatus, greater tuberosity    Special Tests:       Cross body Adduction Negative     Speeds  Negative     Yergason's Negative     Drop arm Negative     Neer Positive     Patton Positive            Large joint arthrocentesis: R subacromial bursa  Strunk Protocol:  procedure performed by consultantConsent: Verbal consent obtained.  Risks and benefits: risks, benefits and alternatives were discussed  Consent given by: patient  Patient understanding: patient states understanding of the procedure being performed  Site marked: the operative site was marked  Radiology Images displayed and confirmed. If images not available, report reviewed: imaging studies available  Patient identity confirmed: verbally with patient  Supporting Documentation  Indications: pain   Procedure Details  Location: shoulder - R subacromial bursa  Preparation: Patient was prepped and draped in the usual sterile fashion  Needle size: 22 G  Ultrasound guidance: no  Approach: posterior  Medications administered: 40 mg triamcinolone acetonide 40 mg/mL; 2 mL bupivacaine 0.25 %    Patient tolerance: patient tolerated the procedure well with no immediate complications  Dressing:  Sterile dressing applied

## 2025-01-10 DIAGNOSIS — J45.20 MILD INTERMITTENT ASTHMA WITHOUT COMPLICATION: ICD-10-CM

## 2025-01-10 DIAGNOSIS — I10 ESSENTIAL HYPERTENSION: ICD-10-CM

## 2025-01-14 ENCOUNTER — OFFICE VISIT (OUTPATIENT)
Dept: FAMILY MEDICINE CLINIC | Facility: CLINIC | Age: 65
End: 2025-01-14
Payer: COMMERCIAL

## 2025-01-14 VITALS
HEIGHT: 62 IN | HEART RATE: 75 BPM | OXYGEN SATURATION: 97 % | SYSTOLIC BLOOD PRESSURE: 130 MMHG | TEMPERATURE: 98 F | BODY MASS INDEX: 40.08 KG/M2 | WEIGHT: 217.81 LBS | DIASTOLIC BLOOD PRESSURE: 62 MMHG

## 2025-01-14 DIAGNOSIS — I10 ESSENTIAL HYPERTENSION: ICD-10-CM

## 2025-01-14 DIAGNOSIS — K59.01 SLOW TRANSIT CONSTIPATION: Primary | ICD-10-CM

## 2025-01-14 DIAGNOSIS — Z12.31 BREAST CANCER SCREENING BY MAMMOGRAM: ICD-10-CM

## 2025-01-14 PROCEDURE — 99213 OFFICE O/P EST LOW 20 MIN: CPT | Performed by: PHYSICIAN ASSISTANT

## 2025-01-14 NOTE — PROGRESS NOTES
"Name: Akiko Kang      : 1960      MRN: [id number]  Encounter Provider: Renee Talamantes PA-C  Encounter Date: 2025   Encounter department: [facility]  :  Assessment & Plan  Slow transit constipation  Patient has had constipation with feeling of incomplete emptying x 1 week.  She is taken 2 blocks for 3 days with some response but inadequate response.  She still feels that there is a lot of stool that is remaining in her gut.  She has retired may not be as active as she once had been.  She also has been drinking a lot of water.  Had some blood on toilet paper when wiping after trying to move stool.       Breast cancer screening by mammogram  Patient's mammogram is due in April and this was ordered for her.  Orders:  •  Mammo screening bilateral w 3d and cad; Future    Essential hypertension  Patient is seeing cardiologist.  Blood pressure is under adequate control at this time.              History of Present Illness     HPI: 64-year-old female recently retired.  Has noted increasing difficulty moving her bowels with ongoing constipation.  No melena or hematochezia.  When she does move her stool, it is soft.    We had a long discussion with regard to the use of MiraLAX versus the stimulant laxatives and she is going to try MiraLAX.  I am having her mix it with Powerade or Gatorade instead of mixing it with water.  She is going to double the dose as recommended on the label.    She has not had any respiratory infections.  She is going to try to get more active since she is no longer working and expending energy at work.  Review of Systems: No other complaints at this time with the exception of the constipation.    Objective   /62 (BP Location: Right arm, Patient Position: Sitting, Cuff Size: Standard)   Pulse 75   Temp 98 °F (36.7 °C) (Oral)   Ht 5' 2\" (1.575 m)   Wt 98.8 kg (217 lb 13 oz)   SpO2 97%   BMI 39.84 kg/m²      Physical Exam: 64-year-old " female reviewed vital signs.  I see her for her primary care services.  She is normotensive and afebrile.    Heart is regular rate without murmur rub or gallops.  Lungs are clear to auscultation.  Abdomen round and soft nontender.  Administrative Statements   I have spent a total time of 20 minutes in caring for this patient on the day of the visit/encounter including Diagnostic results, Prognosis, Risks and benefits of tx options, Instructions for management, Importance of tx compliance, Risk factor reductions, Impressions, Counseling / Coordination of care, Documenting in the medical record, Reviewing / ordering tests, medicine, procedures  , and Obtaining or reviewing history  .    I will see the patient on or after March 31 for her annual exam.

## 2025-01-16 RX ORDER — LOSARTAN POTASSIUM 50 MG/1
50 TABLET ORAL DAILY
Qty: 90 TABLET | Refills: 3 | Status: SHIPPED | OUTPATIENT
Start: 2025-01-16

## 2025-01-16 RX ORDER — ALBUTEROL SULFATE 90 UG/1
INHALANT RESPIRATORY (INHALATION)
Qty: 8.5 G | Refills: 5 | Status: SHIPPED | OUTPATIENT
Start: 2025-01-16

## 2025-01-16 NOTE — TELEPHONE ENCOUNTER
Patient called to request a refill for their losartan 50 mg advised a refill was requested on 1/10/2025 and is pending approval. Patient verbalized understanding and is in agreement.

## 2025-01-29 DIAGNOSIS — E78.2 MIXED HYPERLIPIDEMIA: ICD-10-CM

## 2025-01-29 RX ORDER — ATORVASTATIN CALCIUM 40 MG/1
40 TABLET, FILM COATED ORAL EVERY EVENING
Qty: 90 TABLET | Refills: 0 | Status: SHIPPED | OUTPATIENT
Start: 2025-01-29

## 2025-02-13 ENCOUNTER — VBI (OUTPATIENT)
Dept: ADMINISTRATIVE | Facility: OTHER | Age: 65
End: 2025-02-13

## 2025-02-13 NOTE — TELEPHONE ENCOUNTER
02/13/25 11:03 AM     Chart reviewed for Mammogram was/were not submitted to the patient's insurance.     Roseanne Conde MA   [facility]

## 2025-02-14 ENCOUNTER — APPOINTMENT (OUTPATIENT)
Dept: LAB | Facility: CLINIC | Age: 65
End: 2025-02-14
Payer: COMMERCIAL

## 2025-02-14 ENCOUNTER — OFFICE VISIT (OUTPATIENT)
Dept: OBGYN CLINIC | Facility: CLINIC | Age: 65
End: 2025-02-14
Payer: COMMERCIAL

## 2025-02-14 VITALS — WEIGHT: 217 LBS | HEIGHT: 62 IN | BODY MASS INDEX: 39.93 KG/M2

## 2025-02-14 DIAGNOSIS — Z98.890 HISTORY OF MEDIAL MENISCUS REPAIR OF RIGHT KNEE: ICD-10-CM

## 2025-02-14 DIAGNOSIS — E78.5 DYSLIPIDEMIA, GOAL LDL BELOW 70: ICD-10-CM

## 2025-02-14 DIAGNOSIS — M17.11 ARTHRITIS OF RIGHT KNEE: ICD-10-CM

## 2025-02-14 DIAGNOSIS — M25.561 CHRONIC PAIN OF RIGHT KNEE: Primary | ICD-10-CM

## 2025-02-14 DIAGNOSIS — G89.29 CHRONIC PAIN OF RIGHT KNEE: Primary | ICD-10-CM

## 2025-02-14 PROBLEM — I48.20 ATRIAL FIBRILLATION, CHRONIC (HCC): Status: ACTIVE | Noted: 2023-03-26

## 2025-02-14 PROBLEM — I50.32 CHRONIC DIASTOLIC CONGESTIVE HEART FAILURE (HCC): Status: ACTIVE | Noted: 2025-02-14

## 2025-02-14 LAB
CHOLEST SERPL-MCNC: 205 MG/DL (ref ?–200)
HDLC SERPL-MCNC: 57 MG/DL
LDLC SERPL CALC-MCNC: 119 MG/DL (ref 0–100)
TRIGL SERPL-MCNC: 144 MG/DL (ref ?–150)

## 2025-02-14 PROCEDURE — 80061 LIPID PANEL: CPT

## 2025-02-14 PROCEDURE — 36415 COLL VENOUS BLD VENIPUNCTURE: CPT

## 2025-02-14 PROCEDURE — 99213 OFFICE O/P EST LOW 20 MIN: CPT | Performed by: STUDENT IN AN ORGANIZED HEALTH CARE EDUCATION/TRAINING PROGRAM

## 2025-02-14 NOTE — PROGRESS NOTES
1. Chronic pain of right knee        2. History of medial meniscus repair of right knee        3. Arthritis of right knee              No orders of the defined types were placed in this encounter.       Imaging Studies (I personally reviewed images in PACS and report):    X-ray right knee 4/3/2024: No acute osseous abnormalities.  Moderate-severe medial tibiofemoral joint line narrowing with marginal osteophytes.  Degenerative changes of the patellofemoral joint evidenced by superior and inferior osteophyte formation    IMPRESSION:  History of chronic right knee pain-severe osteoarthritic changes of the knee in the past  Previously given viscosupplementation injection 6 months ago.  Patient feels currently that her pain is mild/tolerable with only sparing use of Tylenol.  Does not feel at this time that she would need to undergo repeat Visco/CSI     Other factors:  Right-hand-dominant and works in an occupation at the [employer] where she does repetitive range of motion movement of her RUE  Chronic anticoagulation on Eliquis due to atrial fibrillation    PLAN:    Clinical exam and radiographic imaging reviewed with patient today, with impression as per above. I have discussed with the patient the pathophysiology of this diagnosis and reviewed how the examination correlates with this diagnosis.    Treatment options were discussed at length including risk/benefits and patient was agreeable to defer a cortisone injection and reordering a viscosupplementation injection as she feels her pain is not severe enough to warrant.  I counseled this is a reasonable option and that she can always call back in the future when she feels that her pain is 8/10 or above and is not responding to acetaminophen/topical NSAIDs.  Imaging obtained of her right knee today as noted above.  I will follow-up official radiology interpretation.  Treatment options were discussed at length including risk/benefits, patient was agreeable to a right knee  "intra-articular cortisone injection as per procedure note below in regards to improving her pain of her knee.  Counseled we can repeat the injection every 3 to 4 months needed in regards to pain control.  Alternatively, we could also consider a viscosupplementation injection if there is less than 3 months of relief from the cortisone injection.  Other conservative treatment does include use of compression knee sleeve during activities, Tylenol, topical NSAIDs (needs to avoid oral NSAIDs as she is chronically on Eliquis), icing/heat therapy.    Return if symptoms worsen or fail to improve.    Portions of the record may have been created with voice recognition software. Occasional wrong word or \"sound a like\" substitutions may have occurred due to the inherent limitations of voice recognition software. Read the chart carefully and recognize, using context, where substitutions have occurred.     CHIEF COMPLAINT:  Chief Complaint   Patient presents with    Follow-up         HPI:  Akiko Kang is a 64 y.o. female  who presents for     Visit 2/14/2025:  Follow-up evaluation of right knee pain  Of note patient has history of severe degenerative changes of her right knee as well as prior history of meniscus tear status post surgical intervention.  Patient seen 6 months ago when she was given viscosupplementation injection of her knee.  Patient states overall she still seems to be doing \"not bad.\"  She states the pain is mild/tolerable while weightbearing.  She states it is typically over the medial aspect of her knee.  She states the pain does not interfere with her activities of daily living.  She states she only sparingly uses Tylenol at this point for her right knee.  She is unsure whether she would need a repeat injection at this time but wanted to follow-up as it has been 6 months.    Of note patient is history of atrial fibrillation on chronic anticoagulation with Eliquis    Medical, Surgical, Family, and Social " "History    Past Medical History:   Diagnosis Date    Allergic     Arthritis     Atrial fibrillation (HCC)     CHF (congestive heart failure) (HCC)     COPD (chronic obstructive pulmonary disease) (HCC)     Coronary artery disease     Diverticulitis of colon     Headache(784.0)     Hypertension     Shingles     Sleep apnea      Past Surgical History:   Procedure Laterality Date    AV NODE ABLATION      CARDIAC SURGERY  2023     SECTION  ,     KNEE SURGERY       Social History   Social History     Substance and Sexual Activity   Alcohol Use Yes    Comment: occassionally     Social History     Substance and Sexual Activity   Drug Use Never     Social History     Tobacco Use   Smoking Status Some Days    Current packs/day: 0.00    Types: Cigarettes    Last attempt to quit: 2019    Years since quittin.1    Passive exposure: Never   Smokeless Tobacco Never     Family History   Problem Relation Age of Onset    Hyperlipidemia Mother     Hypertension Mother     Diverticulitis Mother     Hypertension Father     Hyperlipidemia Father     Heart disease Father     Heart attack Father 57    No Known Problems Sister     No Known Problems Sister     No Known Problems Daughter     No Known Problems Daughter     No Known Problems Maternal Aunt     No Known Problems Paternal Aunt     No Known Problems Paternal Aunt     No Known Problems Maternal Grandmother     No Known Problems Maternal Grandfather     No Known Problems Paternal Grandmother     No Known Problems Paternal Grandfather     Breast cancer Cousin      Allergies   Allergen Reactions    Ace Inhibitors Cough          Physical Exam  Ht 5' 2\" (1.575 m)   Wt 98.4 kg (217 lb)   BMI 39.69 kg/m²     Constitutional:  see vital signs  Gen: BMI 39, normocephalic/atraumatic, well-groomed  Eyes: No inflammation or discharge of conjunctiva or lids; sclera clear   Pharynx: no inflammation, lesion, or mass of lips  Pulmonary/Chest: Effort normal. No respiratory " distress.     Ortho Exam  Right Knee Exam:  Erythema: no  Swelling: no  Increased Warmth: no  Tenderness: +MJL mildly  ROM: 0-130  Knee flexion strength: 5/5  Knee extension strength: 5/5  Patellar Grind: +  Varus laxity: negative  Valgus laxity: negative      Gait: Normal with antalgia        Procedures

## 2025-03-06 DIAGNOSIS — I48.0 PAROXYSMAL ATRIAL FIBRILLATION (HCC): ICD-10-CM

## 2025-03-06 DIAGNOSIS — I51.3 MURAL THROMBUS OF CARDIAC APEX: ICD-10-CM

## 2025-03-07 RX ORDER — APIXABAN 5 MG/1
5 TABLET, FILM COATED ORAL 2 TIMES DAILY
Qty: 60 TABLET | Refills: 0 | Status: SHIPPED | OUTPATIENT
Start: 2025-03-07

## 2025-03-17 DIAGNOSIS — E87.6 HYPOKALEMIA: ICD-10-CM

## 2025-03-18 RX ORDER — POTASSIUM CHLORIDE 1500 MG/1
40 TABLET, EXTENDED RELEASE ORAL DAILY
Qty: 60 TABLET | Refills: 1 | Status: SHIPPED | OUTPATIENT
Start: 2025-03-18

## 2025-04-05 ENCOUNTER — APPOINTMENT (EMERGENCY)
Dept: CT IMAGING | Facility: HOSPITAL | Age: 65
End: 2025-04-05
Payer: COMMERCIAL

## 2025-04-05 ENCOUNTER — APPOINTMENT (EMERGENCY)
Dept: RADIOLOGY | Facility: HOSPITAL | Age: 65
End: 2025-04-05
Payer: COMMERCIAL

## 2025-04-05 ENCOUNTER — HOSPITAL ENCOUNTER (EMERGENCY)
Facility: HOSPITAL | Age: 65
Discharge: HOME | End: 2025-04-05
Attending: EMERGENCY MEDICINE
Payer: COMMERCIAL

## 2025-04-05 VITALS
HEIGHT: 62 IN | OXYGEN SATURATION: 94 % | HEART RATE: 84 BPM | BODY MASS INDEX: 40.93 KG/M2 | RESPIRATION RATE: 18 BRPM | SYSTOLIC BLOOD PRESSURE: 128 MMHG | WEIGHT: 222.44 LBS | DIASTOLIC BLOOD PRESSURE: 71 MMHG | TEMPERATURE: 97.2 F

## 2025-04-05 DIAGNOSIS — J44.1 COPD EXACERBATION (HCC): ICD-10-CM

## 2025-04-05 DIAGNOSIS — M79.601 RIGHT ARM PAIN: Primary | ICD-10-CM

## 2025-04-05 DIAGNOSIS — R93.0 ABNORMAL CT SCAN, SINUS: ICD-10-CM

## 2025-04-05 LAB
ALBUMIN SERPL BCG-MCNC: 4.5 G/DL (ref 3.5–5)
ALP SERPL-CCNC: 79 U/L (ref 34–104)
ALT SERPL W P-5'-P-CCNC: 37 U/L (ref 7–52)
ANION GAP SERPL CALCULATED.3IONS-SCNC: 8 MMOL/L (ref 4–13)
AST SERPL W P-5'-P-CCNC: 31 U/L (ref 13–39)
BASOPHILS # BLD AUTO: 0.05 THOUSANDS/ÂΜL (ref 0–0.1)
BASOPHILS NFR BLD AUTO: 1 % (ref 0–1)
BILIRUB SERPL-MCNC: 0.69 MG/DL (ref 0.2–1)
BNP SERPL-MCNC: 50 PG/ML (ref 0–100)
BUN SERPL-MCNC: 12 MG/DL (ref 5–25)
CALCIUM SERPL-MCNC: 9.4 MG/DL (ref 8.4–10.2)
CARDIAC TROPONIN I PNL SERPL HS: 3 NG/L (ref ?–50)
CHLORIDE SERPL-SCNC: 104 MMOL/L (ref 96–108)
CO2 SERPL-SCNC: 28 MMOL/L (ref 21–32)
CREAT SERPL-MCNC: 0.53 MG/DL (ref 0.6–1.3)
EOSINOPHIL # BLD AUTO: 0.12 THOUSAND/ÂΜL (ref 0–0.61)
EOSINOPHIL NFR BLD AUTO: 2 % (ref 0–6)
ERYTHROCYTE [DISTWIDTH] IN BLOOD BY AUTOMATED COUNT: 13.5 % (ref 11.6–15.1)
GFR SERPL CREATININE-BSD FRML MDRD: 102 ML/MIN/1.73SQ M
GLUCOSE SERPL-MCNC: 101 MG/DL (ref 65–140)
HCT VFR BLD AUTO: 38.9 % (ref 34.8–46.1)
HGB BLD-MCNC: 13.1 G/DL (ref 11.5–15.4)
IMM GRANULOCYTES # BLD AUTO: 0.05 THOUSAND/UL (ref 0–0.2)
IMM GRANULOCYTES NFR BLD AUTO: 1 % (ref 0–2)
LYMPHOCYTES # BLD AUTO: 2.02 THOUSANDS/ÂΜL (ref 0.6–4.47)
LYMPHOCYTES NFR BLD AUTO: 26 % (ref 14–44)
MCH RBC QN AUTO: 30.7 PG (ref 26.8–34.3)
MCHC RBC AUTO-ENTMCNC: 33.7 G/DL (ref 31.4–37.4)
MCV RBC AUTO: 91 FL (ref 82–98)
MONOCYTES # BLD AUTO: 1 THOUSAND/ÂΜL (ref 0.17–1.22)
MONOCYTES NFR BLD AUTO: 13 % (ref 4–12)
NEUTROPHILS # BLD AUTO: 4.44 THOUSANDS/ÂΜL (ref 1.85–7.62)
NEUTS SEG NFR BLD AUTO: 57 % (ref 43–75)
NRBC BLD AUTO-RTO: 0 /100 WBCS
PLATELET # BLD AUTO: 170 THOUSANDS/UL (ref 149–390)
PMV BLD AUTO: 12.1 FL (ref 8.9–12.7)
POTASSIUM SERPL-SCNC: 3.7 MMOL/L (ref 3.5–5.3)
PROT SERPL-MCNC: 7.1 G/DL (ref 6.4–8.4)
RBC # BLD AUTO: 4.27 MILLION/UL (ref 3.81–5.12)
SODIUM SERPL-SCNC: 140 MMOL/L (ref 135–147)
WBC # BLD AUTO: 7.68 THOUSAND/UL (ref 4.31–10.16)

## 2025-04-05 PROCEDURE — 36415 COLL VENOUS BLD VENIPUNCTURE: CPT

## 2025-04-05 PROCEDURE — 70498 CT ANGIOGRAPHY NECK: CPT

## 2025-04-05 PROCEDURE — 99285 EMERGENCY DEPT VISIT HI MDM: CPT | Performed by: PHYSICIAN ASSISTANT

## 2025-04-05 PROCEDURE — 80053 COMPREHEN METABOLIC PANEL: CPT

## 2025-04-05 PROCEDURE — 70496 CT ANGIOGRAPHY HEAD: CPT

## 2025-04-05 PROCEDURE — 96374 THER/PROPH/DIAG INJ IV PUSH: CPT

## 2025-04-05 PROCEDURE — 99285 EMERGENCY DEPT VISIT HI MDM: CPT

## 2025-04-05 PROCEDURE — 93005 ELECTROCARDIOGRAM TRACING: CPT

## 2025-04-05 PROCEDURE — 85025 COMPLETE CBC W/AUTO DIFF WBC: CPT

## 2025-04-05 PROCEDURE — 83880 ASSAY OF NATRIURETIC PEPTIDE: CPT

## 2025-04-05 PROCEDURE — 71046 X-RAY EXAM CHEST 2 VIEWS: CPT

## 2025-04-05 PROCEDURE — 94640 AIRWAY INHALATION TREATMENT: CPT

## 2025-04-05 PROCEDURE — 84484 ASSAY OF TROPONIN QUANT: CPT

## 2025-04-05 RX ORDER — IPRATROPIUM BROMIDE AND ALBUTEROL SULFATE 2.5; .5 MG/3ML; MG/3ML
3 SOLUTION RESPIRATORY (INHALATION) ONCE
Status: COMPLETED | OUTPATIENT
Start: 2025-04-05 | End: 2025-04-05

## 2025-04-05 RX ORDER — AZITHROMYCIN 250 MG/1
TABLET, FILM COATED ORAL
Qty: 6 TABLET | Refills: 0 | Status: SHIPPED | OUTPATIENT
Start: 2025-04-05 | End: 2025-04-09

## 2025-04-05 RX ORDER — METHYLPREDNISOLONE SODIUM SUCCINATE 125 MG/2ML
125 INJECTION, POWDER, LYOPHILIZED, FOR SOLUTION INTRAMUSCULAR; INTRAVENOUS ONCE
Status: COMPLETED | OUTPATIENT
Start: 2025-04-05 | End: 2025-04-05

## 2025-04-05 RX ORDER — METHYLPREDNISOLONE 4 MG/1
TABLET ORAL
Qty: 21 TABLET | Refills: 0 | Status: SHIPPED | OUTPATIENT
Start: 2025-04-05

## 2025-04-05 RX ADMIN — IOHEXOL 100 ML: 350 INJECTION, SOLUTION INTRAVENOUS at 12:45

## 2025-04-05 RX ADMIN — METHYLPREDNISOLONE SODIUM SUCCINATE 125 MG: 125 INJECTION, POWDER, FOR SOLUTION INTRAMUSCULAR; INTRAVENOUS at 12:18

## 2025-04-05 RX ADMIN — IPRATROPIUM BROMIDE AND ALBUTEROL SULFATE 3 ML: .5; 3 SOLUTION RESPIRATORY (INHALATION) at 12:18

## 2025-04-05 NOTE — ED PROVIDER NOTES
Time reflects when diagnosis was documented in both MDM as applicable and the Disposition within this note       Time User Action Codes Description Comment    4/5/2025  1:51 PM Ednamarleen Kinzaindio Monroe [M79.601] Right arm pain     4/5/2025  1:51 PM EdnamarleenKinza [J44.1] COPD exacerbation (HCC)     4/5/2025  1:52 PM Kinza Zacarias [R93.0] Abnormal CT scan, sinus           ED Disposition       ED Disposition   Discharge    Condition   Stable    Date/Time   Sat Apr 5, 2025  1:51 PM    Comment   Akiko Kang discharge to home/self care.                   Assessment & Plan       Medical Decision Making  64-year-old female presented to the emergency department for evaluation of right hand/arm pain, shortness of breath. Vitals and medical record reviewed. The patient was evaluated for complaint related to acute shortness of breath and right arm pain. Patient is potentially at risk for, but not limited to, acute coronary syndrome, arrhythmia, myocardial infarction, pulmonary embolism, pneumothorax, infectious process of the lungs such as pneumonia, reactive airway disease, asthma, COPD exacerbation, cardiomyopathy, congestive heart failure or viral syndrome, CVA/TIA, arthritis, paresthesias, nerve injury. Several of these diagnoses have been evaluated and ruled out by history and physical. As needed, patient will be further evaluated with laboratory and imaging studies. Higher level diagnostics, such as CT imaging or ultrasound, may also be required. Please see work-up portion of the note for further evaluation of patient's risk. Socioeconomic factors were also considered as part of the decision-making process. Unless otherwise stated in the chart or patient is admitted as elsewhere documented, any previously prescribed medications will be maintained.       Problems Addressed:  Abnormal CT scan, sinus: acute illness or injury  COPD exacerbation (HCC): acute illness or injury  Right arm pain: acute illness or  injury    Amount and/or Complexity of Data Reviewed  Labs:  Decision-making details documented in ED Course.  Radiology: ordered.  ECG/medicine tests: ordered.    Risk  Prescription drug management.        ED Course as of 04/05/25 1424   Sat Apr 05, 2025   1229 WBC: 7.68   1231 hs TnI 0hr: 3   1238 BNP: 50   1312 Patient reevaluated.  Resting comfortably.  States she feels like she can breathe better.   1348 CTA: IMPRESSION:  CT Brain:  1.  No acute intracranial hemorrhage, mass effect or edema.     2.  CT Angiography:  3.  No hemodynamically significant stenosis in the major arteries of the neck.  4.  No intracranial aneurysm or major intracranial arterial stenosis.     5.  Other:  6.  Effacement of the left piriform sinus possibly on the basis of secretions. Underlying mucosal mass lesion not excluded. Nonemergent direct visualization/ENT assessment advised.     1409 All results and findings with the patient.  We discussed symptomatic treatment at home appropriate follow-up with Dr. Muniz and her family doctor.  We also discussed follow-up with ENT and patient verbalized understanding.  This point she is clinically and hemodynamically stable for discharge       Medications   ipratropium-albuterol (DUO-NEB) 0.5-2.5 mg/3 mL inhalation solution 3 mL (3 mL Nebulization Given 4/5/25 1218)   methylPREDNISolone sodium succinate (Solu-MEDROL) injection 125 mg (125 mg Intravenous Given 4/5/25 1218)   iohexol (OMNIPAQUE) 350 MG/ML injection (MULTI-DOSE) 100 mL (100 mL Intravenous Given 4/5/25 1245)       ED Risk Strat Scores   HEART Risk Score      Flowsheet Row Most Recent Value   Heart Score Risk Calculator    History 0 Filed at: 04/05/2025 1412   ECG 0 Filed at: 04/05/2025 1412   Age 1 Filed at: 04/05/2025 1412   Risk Factors 1 Filed at: 04/05/2025 1412   Troponin 0 Filed at: 04/05/2025 1412   HEART Score 2 Filed at: 04/05/2025 1412          HEART Risk Score      Flowsheet Row Most Recent Value   Heart Score Risk  Calculator    History 0 Filed at: 04/05/2025 1412   ECG 0 Filed at: 04/05/2025 1412   Age 1 Filed at: 04/05/2025 1412   Risk Factors 1 Filed at: 04/05/2025 1412   Troponin 0 Filed at: 04/05/2025 1412   HEART Score 2 Filed at: 04/05/2025 1412                              SBIRT 22yo+      Flowsheet Row Most Recent Value   Initial Alcohol Screen: US AUDIT-C     1. How often do you have a drink containing alcohol? 0 Filed at: 04/05/2025 1156   2. How many drinks containing alcohol do you have on a typical day you are drinking?  0 Filed at: 04/05/2025 1156   3b. FEMALE Any Age, or MALE 65+: How often do you have 4 or more drinks on one occassion? 0 Filed at: 04/05/2025 1156   Audit-C Score 0 Filed at: 04/05/2025 1156   DONTAE: How many times in the past year have you...    Used an illegal drug or used a prescription medication for non-medical reasons? Never Filed at: 04/05/2025 1156            Wells' Criteria for PE      Flowsheet Row Most Recent Value   Wells' Criteria for PE    Clinical signs and symptoms of DVT 0 Filed at: 04/05/2025 1412   PE is primary diagnosis or equally likely 0 Filed at: 04/05/2025 1412   HR >100 0 Filed at: 04/05/2025 1412   Immobilization at least 3 days or Surgery in the previous 4 weeks 0 Filed at: 04/05/2025 1412   Previous, objectively diagnosed PE or DVT 0 Filed at: 04/05/2025 1412   Hemoptysis --   Malignancy with treatment within 6 months or palliative 0 Filed at: 04/05/2025 1412   Wells' Criteria Total 0 Filed at: 04/05/2025 1412                        History of Present Illness       Chief Complaint   Patient presents with    Arm Pain     Right arm pain and numbness    Shortness of Breath     That started yesterday history CHF and AFIB       Past Medical History:   Diagnosis Date    Allergic     Arthritis     Atrial fibrillation (HCC)     CHF (congestive heart failure) (HCC)     COPD (chronic obstructive pulmonary disease) (HCC)     Coronary artery disease     Diverticulitis of colon      Headache(784.0)     Hypertension     Shingles     Sleep apnea       Past Surgical History:   Procedure Laterality Date    AV NODE ABLATION      CARDIAC SURGERY  2023     SECTION  ,     KNEE SURGERY        Family History   Problem Relation Age of Onset    Hyperlipidemia Mother     Hypertension Mother     Diverticulitis Mother     Hypertension Father     Hyperlipidemia Father     Heart disease Father     Heart attack Father 57    No Known Problems Sister     No Known Problems Sister     No Known Problems Daughter     No Known Problems Daughter     No Known Problems Maternal Aunt     No Known Problems Paternal Aunt     No Known Problems Paternal Aunt     No Known Problems Maternal Grandmother     No Known Problems Maternal Grandfather     No Known Problems Paternal Grandmother     No Known Problems Paternal Grandfather     Breast cancer Cousin       Social History     Tobacco Use    Smoking status: Some Days     Current packs/day: 0.00     Types: Cigarettes     Last attempt to quit: 2019     Years since quittin.2     Passive exposure: Never    Smokeless tobacco: Never   Vaping Use    Vaping status: Some Days    Substances: Flavoring   Substance Use Topics    Alcohol use: Yes     Comment: occassionally    Drug use: Never      E-Cigarette/Vaping    E-Cigarette Use Current Some Day User       E-Cigarette/Vaping Substances    Nicotine No     THC No     CBD No     Flavoring Yes     Other No     Unknown No       I have reviewed and agree with the history as documented.     64 year old female states around 9pm she was laying in bed and noticed tingling in the right hand and pain in the right hand and in the right arm.  Patient states she had difficulty sleeping due to pain in the right arm.  She denies any pain or weakness in the right leg.  States it felt like her right arm had fallen asleep.  Reports she still has a tingling sensation in the right hand.  She denies any headaches dizziness  "confusion slurred speech.  She denies any complete numbness.  She denies any visual changes.  Patient denies a history of stroke.  States she does have history of significant arthritis.  Reports she does get injections in the right shoulder for arthritis. States she was not laying on her right sided when the pain started.   Patient also reports difficulty breathing. States it feels \"tight\". Did have some improvement with her inhaler. Reports history of CHF, Afib, and COPD. + smoker about 1/2 pack per day or less. Also vapes. Chronic cough. No fevers. States this feels different from when she had a previous CHF/A-fib flare.  Reports she does take Lasix as prescribed.  Is urinating frequently.  She denies any leg swelling.  Denies any chest pain palpitations.  Denies any history of DVT or PE.        Review of Systems   Constitutional:  Negative for appetite change, fatigue and fever.   Respiratory:  Positive for cough, chest tightness and shortness of breath. Negative for choking, wheezing and stridor.    Cardiovascular:  Negative for chest pain, palpitations and leg swelling.   Gastrointestinal:  Negative for abdominal pain, diarrhea, nausea and vomiting.   Genitourinary: Negative.    Musculoskeletal:         Right arm pain  Right hand tingling   Skin: Negative.    All other systems reviewed and are negative.          Objective       ED Triage Vitals [04/05/25 1153]   Temperature Pulse Blood Pressure Respirations SpO2 Patient Position - Orthostatic VS   (!) 97.2 °F (36.2 °C) 80 142/66 17 97 % Lying      Temp Source Heart Rate Source BP Location FiO2 (%) Pain Score    Temporal Monitor Left arm -- 4      Vitals      Date and Time Temp Pulse SpO2 Resp BP Pain Score FACES Pain Rating User   04/05/25 1400 -- 84 94 % 18 128/71 -- -- SB   04/05/25 1330 -- 85 94 % 18 119/56 -- -- SB   04/05/25 1300 -- 76 94 % 17 122/56 -- -- SB   04/05/25 1215 -- 94 96 % 17 145/61 -- -- SB   04/05/25 1203 -- -- -- -- -- 4 -- SB   04/05/25 " 1153 97.2 °F (36.2 °C) 80 97 % 17 142/66 4 -- KM            Physical Exam  Vitals reviewed.   Constitutional:       General: She is not in acute distress.     Appearance: She is well-developed. She is not ill-appearing, toxic-appearing or diaphoretic.   HENT:      Head: Normocephalic and atraumatic.      Mouth/Throat:      Mouth: Mucous membranes are moist.   Eyes:      Extraocular Movements: Extraocular movements intact.      Pupils: Pupils are equal, round, and reactive to light.   Cardiovascular:      Rate and Rhythm: Normal rate and regular rhythm.      Pulses:           Radial pulses are 2+ on the right side and 2+ on the left side.   Pulmonary:      Effort: Pulmonary effort is normal. No respiratory distress.      Breath sounds: No stridor. Wheezing present. No decreased breath sounds, rhonchi or rales.   Chest:      Chest wall: No mass or tenderness.   Abdominal:      Palpations: Abdomen is soft.      Tenderness: There is no abdominal tenderness.   Musculoskeletal:         General: Normal range of motion.      Right lower leg: No tenderness. No edema.      Left lower leg: No tenderness. No edema.   Skin:     General: Skin is warm and dry.      Findings: No ecchymosis, erythema or rash.   Neurological:      General: No focal deficit present.      Mental Status: She is alert and oriented to person, place, and time.      GCS: GCS eye subscore is 4. GCS verbal subscore is 5. GCS motor subscore is 6.      Sensory: Sensation is intact.      Motor: Motor function is intact. No weakness or tremor.   Psychiatric:         Mood and Affect: Mood normal.         Results Reviewed       Procedure Component Value Units Date/Time    B-Type Natriuretic Peptide(BNP) [[id number]]  (Normal) Collected: 04/05/25 1159    Lab Status: Final result Specimen: Blood from Arm, Left Updated: 04/05/25 1234     BNP 50 pg/mL     HS Troponin I 2hr [[id number]]     Lab Status: No result Specimen: Blood     HS Troponin I 4hr [[id number]]     Lab  Status: No result Specimen: Blood     HS Troponin 0hr (reflex protocol) [[id number]]  (Normal) Collected: 04/05/25 1159    Lab Status: Final result Specimen: Blood from Arm, Left Updated: 04/05/25 1228     hs TnI 0hr 3 ng/L     Comprehensive metabolic panel [[id number]]  (Abnormal) Collected: 04/05/25 1159    Lab Status: Final result Specimen: Blood from Arm, Left Updated: 04/05/25 1224     Sodium 140 mmol/L      Potassium 3.7 mmol/L      Chloride 104 mmol/L      CO2 28 mmol/L      ANION GAP 8 mmol/L      BUN 12 mg/dL      Creatinine 0.53 mg/dL      Glucose 101 mg/dL      Calcium 9.4 mg/dL      AST 31 U/L      ALT 37 U/L      Alkaline Phosphatase 79 U/L      Total Protein 7.1 g/dL      Albumin 4.5 g/dL      Total Bilirubin 0.69 mg/dL      eGFR 102 ml/min/1.73sq m     Narrative:      National Kid[employer] guidelines for Chronic Kidney Disease (CKD):     Stage 1 with normal or high GFR (GFR > 90 mL/min/1.73 square meters)    Stage 2 Mild CKD (GFR = 60-89 mL/min/1.73 square meters)    Stage 3A Moderate CKD (GFR = 45-59 mL/min/1.73 square meters)    Stage 3B Moderate CKD (GFR = 30-44 mL/min/1.73 square meters)    Stage 4 Severe CKD (GFR = 15-29 mL/min/1.73 square meters)    Stage 5 End Stage CKD (GFR <15 mL/min/1.73 square meters)  Note: GFR calculation is accurate only with a steady state creatinine    CBC and differential [[id number]]  (Abnormal) Collected: 04/05/25 1159    Lab Status: Final result Specimen: Blood from Arm, Left Updated: 04/05/25 1205     WBC 7.68 Thousand/uL      RBC 4.27 Million/uL      Hemoglobin 13.1 g/dL      Hematocrit 38.9 %      MCV 91 fL      MCH 30.7 pg      MCHC 33.7 g/dL      RDW 13.5 %      MPV 12.1 fL      Platelets 170 Thousands/uL      nRBC 0 /100 WBCs      Segmented % 57 %      Immature Grans % 1 %      Lymphocytes % 26 %      Monocytes % 13 %      Eosinophils Relative 2 %      Basophils Relative 1 %      Absolute Neutrophils 4.44 Thousands/µL      Absolute Immature Grans  0.05 Thousand/uL      Absolute Lymphocytes 2.02 Thousands/µL      Absolute Monocytes 1.00 Thousand/µL      Eosinophils Absolute 0.12 Thousand/µL      Basophils Absolute 0.05 Thousands/µL             CTA head and neck with and without contrast   Final Interpretation by Pablito Malin MD (04/05 3905)   CT Brain:   1.  No acute intracranial hemorrhage, mass effect or edema.      2.  CT Angiography:   3.  No hemodynamically significant stenosis in the major arteries of the neck.   4.  No intracranial aneurysm or major intracranial arterial stenosis.      5.  Other:   6.  Effacement of the left piriform sinus possibly on the basis of secretions. Underlying mucosal mass lesion not excluded. Nonemergent direct visualization/ENT assessment advised.         Resident: RAMA BURGER I, the attending radiologist, have reviewed the images and agree with the final report above.      Workstation performed: [id number]         XR chest 2 views    (Results Pending)       ECG 12 Lead Documentation Only    Date/Time: 4/5/2025 12:23 PM    Performed by: Kinza Zacarias PA-C  Authorized by: Kinza Zacarias PA-C    Patient location:  ED  Interpretation:     Interpretation: non-specific    Rate:     ECG rate:  72    ECG rate assessment: normal    Rhythm:     Rhythm: sinus rhythm    Ectopy:     Ectopy: none    QRS:     QRS axis:  Left    QRS intervals:  Normal  Conduction:     Conduction: normal        ED Medication and Procedure Management   Prior to Admission Medications   Prescriptions Last Dose Informant Patient Reported? Taking?   Multiple Vitamin (One-Daily Multi-Vitamin) TABS   Yes No   Sig: Take by mouth   Specialty Vitamins Products (Advanced Collagen) TABS   Yes No   Sig: Take by mouth   albuterol (PROVENTIL HFA,VENTOLIN HFA) 90 mcg/act inhaler   No No   Sig: inhale 2 puffs by mouth every 6 hours if needed for wheezing   apixaban (Eliquis) 5 mg   No No   Sig: take 1 tablet by mouth twice a day   aspirin 81 mg chewable  tablet   Yes No   Sig: Chew 81 mg daily   atorvastatin (LIPITOR) 40 mg tablet   No No   Sig: take 1 tablet by mouth every evening   busPIRone (BUSPAR) 7.5 mg tablet   No No   Sig: Take 1 tablet (7.5 mg total) by mouth 2 (two) times a day   furosemide (LASIX) 40 mg tablet   No No   Sig: take 1 tablet by mouth twice a day   losartan (COZAAR) 50 mg tablet   No No   Sig: take 1 tablet by mouth once daily   metoprolol succinate (TOPROL-XL) 25 mg 24 hr tablet   No No   Sig: take 1 tablet by mouth twice a day   potassium chloride (Klor-Con M20) 20 mEq tablet   No No   Sig: take 2 tablets by mouth daily      Facility-Administered Medications: None     Discharge Medication List as of 4/5/2025  1:53 PM        START taking these medications    Details   azithromycin (ZITHROMAX) 250 mg tablet Take 2 tablets today then 1 tablet daily x 4 days, Normal      methylPREDNISolone 4 MG tablet therapy pack Use as directed on package, Normal           CONTINUE these medications which have NOT CHANGED    Details   albuterol (PROVENTIL HFA,VENTOLIN HFA) 90 mcg/act inhaler inhale 2 puffs by mouth every 6 hours if needed for wheezing, Normal      apixaban (Eliquis) 5 mg take 1 tablet by mouth twice a day, Starting Fri 3/7/2025, Normal      aspirin 81 mg chewable tablet Chew 81 mg daily, Historical Med      atorvastatin (LIPITOR) 40 mg tablet take 1 tablet by mouth every evening, Starting Wed 1/29/2025, Normal      busPIRone (BUSPAR) 7.5 mg tablet Take 1 tablet (7.5 mg total) by mouth 2 (two) times a day, Starting Tue 8/20/2024, Normal      furosemide (LASIX) 40 mg tablet take 1 tablet by mouth twice a day, Starting Tue 8/20/2024, Normal      losartan (COZAAR) 50 mg tablet take 1 tablet by mouth once daily, Starting Thu 1/16/2025, Normal      metoprolol succinate (TOPROL-XL) 25 mg 24 hr tablet take 1 tablet by mouth twice a day, Starting Tue 11/12/2024, Normal      Multiple Vitamin (One-Daily Multi-Vitamin) TABS Take by mouth, Historical Med       potassium chloride (Klor-Con M20) 20 mEq tablet take 2 tablets by mouth daily, Starting Tue 3/18/2025, Normal      Specialty Vitamins Products (Advanced Collagen) TABS Take by mouth, Historical Med             ED SEPSIS DOCUMENTATION   Time reflects when diagnosis was documented in both MDM as applicable and the Disposition within this note       Time User Action Codes Description Comment    4/5/2025  1:51 PM Kinza Zacarias [M79.601] Right arm pain     4/5/2025  1:51 PM Kinza Zacarias [J44.1] COPD exacerbation (HCC)     4/5/2025  1:52 PM Kinza Zacarias [R93.0] Abnormal CT scan, sinus                  Kinza Zacarias PA-C  04/05/25 1560     Yes

## 2025-04-05 NOTE — DISCHARGE INSTRUCTIONS
Please follow-up with your orthopedic/PCP regarding your arm pain.  Please continue using your inhaler at home.  Steroid and antibiotics sent to your pharmacy for you suspected COPD exacerbation.  Additionally would like you to follow-up with her ENT specialist due to your abnormal imaging results that we discussed.  Please return to the emergency department any new or worsening symptoms.  CTA head and neck with and without contrast   Final Result   CT Brain:   1.  No acute intracranial hemorrhage, mass effect or edema.      2.  CT Angiography:   3.  No hemodynamically significant stenosis in the major arteries of the neck.   4.  No intracranial aneurysm or major intracranial arterial stenosis.      5.  Other:   6.  Effacement of the left piriform sinus possibly on the basis of secretions. Underlying mucosal mass lesion not excluded. Nonemergent direct visualization/ENT assessment advised.         Resident: RAMA BURGER I, the attending radiologist, have reviewed the images and agree with the final report above.      Workstation performed: [id number]         XR chest 2 views    (Results Pending)

## 2025-04-07 DIAGNOSIS — I51.3 MURAL THROMBUS OF CARDIAC APEX: ICD-10-CM

## 2025-04-07 DIAGNOSIS — I48.0 PAROXYSMAL ATRIAL FIBRILLATION (HCC): ICD-10-CM

## 2025-04-07 LAB
ATRIAL RATE: 72 BPM
P AXIS: 53 DEGREES
PR INTERVAL: 140 MS
QRS AXIS: -34 DEGREES
QRSD INTERVAL: 82 MS
QT INTERVAL: 398 MS
QTC INTERVAL: 435 MS
T WAVE AXIS: 25 DEGREES
VENTRICULAR RATE: 72 BPM

## 2025-04-07 PROCEDURE — 93010 ELECTROCARDIOGRAM REPORT: CPT | Performed by: INTERNAL MEDICINE

## 2025-04-08 RX ORDER — APIXABAN 5 MG/1
5 TABLET, FILM COATED ORAL 2 TIMES DAILY
Qty: 60 TABLET | Refills: 0 | Status: SHIPPED | OUTPATIENT
Start: 2025-04-08

## 2025-04-18 ENCOUNTER — HOSPITAL ENCOUNTER (OUTPATIENT)
Dept: RADIOLOGY | Facility: CLINIC | Age: 65
End: 2025-04-18
Payer: COMMERCIAL

## 2025-04-18 VITALS — HEIGHT: 61 IN | WEIGHT: 211 LBS | BODY MASS INDEX: 39.84 KG/M2

## 2025-04-18 DIAGNOSIS — Z12.31 BREAST CANCER SCREENING BY MAMMOGRAM: ICD-10-CM

## 2025-04-18 PROCEDURE — 77063 BREAST TOMOSYNTHESIS BI: CPT

## 2025-04-18 PROCEDURE — 77067 SCR MAMMO BI INCL CAD: CPT

## 2025-04-29 ENCOUNTER — TELEPHONE (OUTPATIENT)
Dept: FAMILY MEDICINE CLINIC | Facility: CLINIC | Age: 65
End: 2025-04-29

## 2025-05-02 DIAGNOSIS — E78.2 MIXED HYPERLIPIDEMIA: ICD-10-CM

## 2025-05-02 RX ORDER — ATORVASTATIN CALCIUM 40 MG/1
40 TABLET, FILM COATED ORAL EVERY EVENING
Qty: 90 TABLET | Refills: 1 | Status: SHIPPED | OUTPATIENT
Start: 2025-05-02

## 2025-05-06 DIAGNOSIS — I51.3 MURAL THROMBUS OF CARDIAC APEX: ICD-10-CM

## 2025-05-06 DIAGNOSIS — I48.0 PAROXYSMAL ATRIAL FIBRILLATION (HCC): ICD-10-CM

## 2025-05-07 RX ORDER — APIXABAN 5 MG/1
5 TABLET, FILM COATED ORAL 2 TIMES DAILY
Qty: 60 TABLET | Refills: 0 | OUTPATIENT
Start: 2025-05-07

## 2025-05-17 DIAGNOSIS — I10 PRIMARY HYPERTENSION: ICD-10-CM

## 2025-05-18 RX ORDER — METOPROLOL SUCCINATE 25 MG/1
25 TABLET, EXTENDED RELEASE ORAL 2 TIMES DAILY
Qty: 60 TABLET | Refills: 5 | Status: SHIPPED | OUTPATIENT
Start: 2025-05-18

## 2025-05-27 DIAGNOSIS — I48.0 PAROXYSMAL ATRIAL FIBRILLATION (HCC): ICD-10-CM

## 2025-05-27 DIAGNOSIS — I51.3 MURAL THROMBUS OF CARDIAC APEX: ICD-10-CM

## 2025-06-09 ENCOUNTER — OFFICE VISIT (OUTPATIENT)
Dept: FAMILY MEDICINE CLINIC | Facility: CLINIC | Age: 65
End: 2025-06-09
Payer: COMMERCIAL

## 2025-06-09 VITALS
BODY MASS INDEX: 42.61 KG/M2 | HEART RATE: 88 BPM | SYSTOLIC BLOOD PRESSURE: 120 MMHG | TEMPERATURE: 98.2 F | WEIGHT: 225.53 LBS | OXYGEN SATURATION: 98 % | DIASTOLIC BLOOD PRESSURE: 62 MMHG

## 2025-06-09 DIAGNOSIS — I48.0 PAROXYSMAL ATRIAL FIBRILLATION (HCC): ICD-10-CM

## 2025-06-09 DIAGNOSIS — F41.1 GAD (GENERALIZED ANXIETY DISORDER): ICD-10-CM

## 2025-06-09 DIAGNOSIS — J45.20 MILD INTERMITTENT ASTHMA WITHOUT COMPLICATION: ICD-10-CM

## 2025-06-09 DIAGNOSIS — I10 PRIMARY HYPERTENSION: ICD-10-CM

## 2025-06-09 DIAGNOSIS — R20.0 NUMBNESS AND TINGLING IN BOTH HANDS: ICD-10-CM

## 2025-06-09 DIAGNOSIS — R63.8 DIETARY INDISCRETION: ICD-10-CM

## 2025-06-09 DIAGNOSIS — E78.2 MIXED HYPERLIPIDEMIA: ICD-10-CM

## 2025-06-09 DIAGNOSIS — M19.011 ARTHRITIS OF RIGHT ACROMIOCLAVICULAR JOINT: ICD-10-CM

## 2025-06-09 DIAGNOSIS — Z00.00 ANNUAL PHYSICAL EXAM: Primary | ICD-10-CM

## 2025-06-09 DIAGNOSIS — I51.3 MURAL THROMBUS OF CARDIAC APEX: ICD-10-CM

## 2025-06-09 DIAGNOSIS — I10 ESSENTIAL HYPERTENSION: ICD-10-CM

## 2025-06-09 DIAGNOSIS — R60.0 PERIPHERAL EDEMA: ICD-10-CM

## 2025-06-09 DIAGNOSIS — R20.2 NUMBNESS AND TINGLING IN BOTH HANDS: ICD-10-CM

## 2025-06-09 PROCEDURE — 99396 PREV VISIT EST AGE 40-64: CPT | Performed by: PHYSICIAN ASSISTANT

## 2025-06-09 PROCEDURE — 99214 OFFICE O/P EST MOD 30 MIN: CPT | Performed by: PHYSICIAN ASSISTANT

## 2025-06-09 NOTE — PROGRESS NOTES
Name: Akiko Kang      : 1960      MRN: [id number]  Encounter Provider: Renee Talamantes PA-C  Encounter Date: 2025   Encounter department: [facility] PRIMARY CARE  :  Assessment & Plan  Annual physical exam  Physical exam completed as part of today's visit.  Patient also had acute complaints of dyspnea upon exertion and increased peripheral edema and was very concerned about whether she had recurrence of atrial fibrillation or actual heart failure.       Peripheral edema  Patient has noticed increased swelling in both lower legs over the last several days.  Patient also noted increasing dyspnea.  She has gained approximately 14 pounds since her last appointment.  This might have been caused by the swelling but patient also thinks that she did gain weight.  Upon closer questioning, the patient admits to having a lot of potato chips.  We talked about how increased salt can lead to water retention.  Patient continues to take Lasix on a daily basis but she feels the Lasix is not as effective as it had been in the past.       Essential hypertension  Patient's blood pressure well-controlled with the use of metoprolol and losartan.       Numbness and tingling in both hands  Patient states that certain maneuvers will cause her to have numbness and tingling in both upper extremities and hands.  She has tingling sensations in these areas.  She has a lot of neck pain with range of motion.  She is willing to go to see Dr. Ann to see if intervention for her neck complaints with peripheral neuropathy can be helped by him.  Orders:    Ambulatory referral to Spine & Pain Management; Future    Dietary indiscretion  Patient has been eating a lot of potato chips which contains a lot of salt which probably has caused her to have increasing peripheral edema and dyspnea upon exertion.       Arthritis of right acromioclavicular joint  Patient continues to have pain of the right  acromioclavicular joint.  This area is much more prominent on palpation.       VERONICA (generalized anxiety disorder)  Patient's VERONICA score is now at 4.  She continues to have some minor anxiety and this responds to buspirone.       Mixed hyperlipidemia  Continues with atorvastatin.               History of Present Illness   HPI: 65-year-old female sees me for her primary care services.  Annual physical done today but patient also had a lot of ongoing complaints.  She was terrified that her atrial fibrillation has returned.  She had undergone ablation previously approximately 2 years ago and she has not had any recurrence of her atrial fibrillation.  She is not feeling her heart racing.  She states that in the past when she would have the symptoms it was related to A-fib but she currently does not have the symptoms occurring.  Her heart was regular rate and her pulse was completely regular and seen in the office today.    Patient admits to having a lot of potato chips.  She is also eating a lot of red meat.  We talked at length about the patient having salt increase leading to water weight increase.  She also thinks that she has gained some weight it is more than just the water weight.    She is looking forward to going to a water park next week in [place].  They are going for 3 days and she is going with her grandchildren or thrilled that the patient is able to attend.    Patient has some dyspnea upon exertion but this could be related to her weight gain and deconditioning.  She is going to try to increase her activity as she admits a very sedentary lifestyle.    Her anxiety is not about the same level as it had been.  Patient states that there are times that being with her  for long periods of time can increase her anxiety.  Review of Systems: No recent URI or viral syndrome.  Breathing has been at baseline other than the dyspnea with exertion.    Objective   /62   Pulse 88   Temp 98.2 °F (36.8 °C)  (Tympanic)   Wt 102 kg (225 lb 8.5 oz)   SpO2 98%   BMI 42.61 kg/m²      Physical Exam: Reviewed vital signs.  She is normotensive and afebrile.  Pulse is regular.    Well-developed well-nourished 65 y.o. year old female who is cooperative with the exam.  Patient is alert and oriented x3.  Patient is appropriate in answering all questions.    HEENT:  Normocephalic.  PERRLA.  EOMs intact.  TMs are clear with identification of bony landmarks.  No tragus or pinnae tenderness.  No pre or posterior auricular adenopathy.  Sinuses without tenderness.  Throat without hyperemia.  Neck:  Supple without adenopathy.  Thyroid midline without thyromegaly or bruits.  No carotid bruits.  Chest symmetric and nontender.  Heart regular rate and rhythm.  No murmur rubs or gallops.  Point of maximum impulse not displaced.  Lungs are clear to auscultation.  Breathing is nonlabored.  Aerating bases well.  Abdomen round and soft positive bowel sounds without masses tenderness or organomegaly.  She has a negative hepatojugular reflex.  Extremities reveal adequate peripheral pulses with peripheral edema bimalleolarly.  I was able to feel her posterior tibial pulses bilaterally.  Edema was pitting..      Administrative Statements   I have spent a total time of 45 minutes in caring for this patient on the day of the visit/encounter including Diagnostic results, Prognosis, Risks and benefits of tx options, Instructions for management, Patient and family education, Importance of tx compliance, Risk factor reductions, Impressions, Counseling / Coordination of care, Documenting in the medical record, Reviewing/placing orders in the medical record (including tests, medications, and/or procedures), and Obtaining or reviewing history  .    I did review her ER findings from April 2025.  At that point, she was seen in the ER with right arm pain.  Her troponin level and BNP level was normal showing no signs of heart failure or heart damage.  I do not  think she is in either A-fib or heart failure at this time but feel that her peripheral edema and dyspnea upon exertion is directly related to the dietary indiscretion.  I will see the patient in 4 months to follow-up and also would be happy to see her in the interim if necessary.

## 2025-06-09 NOTE — ASSESSMENT & PLAN NOTE
Patient continues to have pain of the right acromioclavicular joint.  This area is much more prominent on palpation.

## 2025-06-09 NOTE — ASSESSMENT & PLAN NOTE
Patient states that certain maneuvers will cause her to have numbness and tingling in both upper extremities and hands.  She has tingling sensations in these areas.  She has a lot of neck pain with range of motion.  She is willing to go to see Dr. Ann to see if intervention for her neck complaints with peripheral neuropathy can be helped by him.  Orders:    Ambulatory referral to Spine & Pain Management; Future

## 2025-06-09 NOTE — ASSESSMENT & PLAN NOTE
Patient's VERONICA score is now at 4.  She continues to have some minor anxiety and this responds to buspirone.

## 2025-06-10 DIAGNOSIS — I10 PRIMARY HYPERTENSION: ICD-10-CM

## 2025-06-10 DIAGNOSIS — J45.20 MILD INTERMITTENT ASTHMA WITHOUT COMPLICATION: ICD-10-CM

## 2025-06-10 DIAGNOSIS — F41.1 GAD (GENERALIZED ANXIETY DISORDER): ICD-10-CM

## 2025-06-10 DIAGNOSIS — R06.02 SHORTNESS OF BREATH: ICD-10-CM

## 2025-06-10 RX ORDER — ALBUTEROL SULFATE 90 UG/1
INHALANT RESPIRATORY (INHALATION)
Qty: 8.5 G | Refills: 5 | OUTPATIENT
Start: 2025-06-10

## 2025-06-10 RX ORDER — BUSPIRONE HYDROCHLORIDE 7.5 MG/1
7.5 TABLET ORAL 2 TIMES DAILY
Qty: 60 TABLET | Refills: 5 | OUTPATIENT
Start: 2025-06-10

## 2025-06-10 RX ORDER — ALBUTEROL SULFATE 90 UG/1
2 INHALANT RESPIRATORY (INHALATION) EVERY 4 HOURS PRN
Qty: 8.5 G | Refills: 5 | Status: SHIPPED | OUTPATIENT
Start: 2025-06-10

## 2025-06-10 RX ORDER — METOPROLOL SUCCINATE 25 MG/1
25 TABLET, EXTENDED RELEASE ORAL 2 TIMES DAILY
Qty: 60 TABLET | Refills: 5 | OUTPATIENT
Start: 2025-06-10

## 2025-06-10 RX ORDER — BUSPIRONE HYDROCHLORIDE 7.5 MG/1
7.5 TABLET ORAL 2 TIMES DAILY
Qty: 60 TABLET | Refills: 5 | Status: SHIPPED | OUTPATIENT
Start: 2025-06-10

## 2025-06-10 RX ORDER — METOPROLOL SUCCINATE 25 MG/1
25 TABLET, EXTENDED RELEASE ORAL 2 TIMES DAILY
Qty: 60 TABLET | Refills: 5 | Status: SHIPPED | OUTPATIENT
Start: 2025-06-10

## 2025-06-13 DIAGNOSIS — I48.0 PAROXYSMAL ATRIAL FIBRILLATION (HCC): ICD-10-CM

## 2025-06-13 DIAGNOSIS — I51.3 MURAL THROMBUS OF CARDIAC APEX: ICD-10-CM

## 2025-06-13 RX ORDER — APIXABAN 5 MG/1
TABLET, FILM COATED ORAL
Qty: 60 TABLET | Refills: 5 | Status: SHIPPED | OUTPATIENT
Start: 2025-06-13

## 2025-06-16 DIAGNOSIS — E87.6 HYPOKALEMIA: ICD-10-CM

## 2025-06-16 RX ORDER — POTASSIUM CHLORIDE 1500 MG/1
40 TABLET, EXTENDED RELEASE ORAL DAILY
Qty: 60 TABLET | Refills: 5 | Status: SHIPPED | OUTPATIENT
Start: 2025-06-16

## 2025-06-16 NOTE — TELEPHONE ENCOUNTER
Reason for call: needs to be resent to new pharmacy   [x] Refill   [] Prior Auth  [] Other:     Office:   [x] PCP/Provider -   [] Specialty/Provider -     Medication:     potassium chloride (Klor-Con M20) 20 mEq tablet       Dose/Frequency: take 2 tablets by mouth daily,     Quantity: 60 tablet     Pharmacy: [facility] - [place], [place] - [address]   Does the patient have enough for 3 days?   [x] Yes   [] No - Send as HP to POD

## 2025-06-17 ENCOUNTER — OFFICE VISIT (OUTPATIENT)
Dept: OBGYN CLINIC | Facility: CLINIC | Age: 65
End: 2025-06-17
Payer: COMMERCIAL

## 2025-06-17 VITALS — HEIGHT: 61 IN | BODY MASS INDEX: 42.67 KG/M2 | WEIGHT: 226 LBS

## 2025-06-17 DIAGNOSIS — M25.511 ACUTE PAIN OF RIGHT SHOULDER: Primary | ICD-10-CM

## 2025-06-17 DIAGNOSIS — M19.011 PRIMARY OSTEOARTHRITIS OF RIGHT SHOULDER: ICD-10-CM

## 2025-06-17 DIAGNOSIS — M25.511 ARTHRALGIA OF RIGHT ACROMIOCLAVICULAR JOINT: ICD-10-CM

## 2025-06-17 PROCEDURE — 99213 OFFICE O/P EST LOW 20 MIN: CPT | Performed by: STUDENT IN AN ORGANIZED HEALTH CARE EDUCATION/TRAINING PROGRAM

## 2025-06-17 PROCEDURE — 20606 DRAIN/INJ JOINT/BURSA W/US: CPT | Performed by: STUDENT IN AN ORGANIZED HEALTH CARE EDUCATION/TRAINING PROGRAM

## 2025-06-17 RX ORDER — BUPIVACAINE HYDROCHLORIDE 2.5 MG/ML
0.5 INJECTION, SOLUTION INFILTRATION; PERINEURAL
Status: COMPLETED | OUTPATIENT
Start: 2025-06-17 | End: 2025-06-17

## 2025-06-17 RX ORDER — TRIAMCINOLONE ACETONIDE 40 MG/ML
20 INJECTION, SUSPENSION INTRA-ARTICULAR; INTRAMUSCULAR
Status: COMPLETED | OUTPATIENT
Start: 2025-06-17 | End: 2025-06-17

## 2025-06-17 RX ADMIN — BUPIVACAINE HYDROCHLORIDE 0.5 ML: 2.5 INJECTION, SOLUTION INFILTRATION; PERINEURAL at 09:30

## 2025-06-17 RX ADMIN — TRIAMCINOLONE ACETONIDE 20 MG: 40 INJECTION, SUSPENSION INTRA-ARTICULAR; INTRAMUSCULAR at 09:30

## 2025-06-17 NOTE — PROGRESS NOTES
Name: Akiko Kang      : 1960      MRN: [id number]  Encounter Provider: Griffin Muniz MD  Encounter Date: 2025   Encounter department: [facility] [place]      Assessment & Plan  Acute pain of right shoulder  Arthralgia of right acromioclavicular joint  Primary osteoarthritis of right shoulder  Ddx: Primary osteoarthritis of AC joint/arthralgia pain, impingement syndrome of right shoulder  Atraumatic pain of right shoulder with activities of daily living, specifically gardening    Clinical exam and radiographic imaging reviewed with patient/parent today, with impression as per above. I have discussed with the patient the pathophysiology of this diagnosis and reviewed how the examination correlates with this diagnosis.    Imaging obtained/reviewed as per below. I will follow up official radiology interpretation.  Educated patient on various conservative treatment options including but are certainly not limited to: rest, ice, heat, compression, elevation, activity modification, anti-inflammatory medication, physical therapy, and/or injections.  Treatment options were discussed at length, including risks and benefits; after discussing these treatment options, the patient elected to undergo ultrasound-guided right AC joint cortisone injection given her pain seems more localized in this region today.  Could consider a subacromial cortisone injection if there is not progressive relief from this intervention over the next couple weeks.      Other factors:  Right-hand-dominant  Prior occupation requiring repetitive motion of right shoulder exacerbating impingement syndrome.  Currently retired  Prior relief from subacromial cortisone injections of right shoulder in the past.  On clinical exam today pain seems more localized over the AC joint to suggest AC joint arthritis pain  Cannot take NSAIDs as she is on Eliquis  Tobacco use disorder    Orders:    Medium joint arthrocentesis: R  acromioclavicular         Return if symptoms worsen or fail to improve.    History of Present Illness       Chief Complaint   Patient presents with    Follow-up       HPI:  Akiko Kang is a 65 y.o. female  who presents for     Visit 6/17/2025:  Follow-up right shoulder pain  History as per below.    Patient has undergone subacromial cortisone injections of right shoulder in the past with relief.  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  She states she has since retired from this occupation.  She states she has been doing overall well with her shoulder until about a few weeks ago when she noticed progressive pain mostly over the dorsal aspect of her shoulder.  She denies any injury but has been doing a lot more gardening.  She describes a sharp/aching pain of moderate intensity.  She states the pain can sometimes radiate to the lateral aspect of her shoulder.  She states it is in a slightly different region than when she was dealing with shoulder pain in the past.  She still feels she has mostly intact range of motion of her shoulder but does feel worsening pain when attempting to reach above shoulder height or behind her back as well as lying on her right lateral shoulder.  No relief from Tylenol, lidocaine patching, icing.  Cannot take NSAIDs due to being on Eliquis.  She is wondering whether she can receive a cortisone injection of her shoulder today as she has an upcoming trip at the end of the week.    Visit 11/22/2024:  Follow-up evaluation of right shoulder pain  Of note patient underwent subacromial cortisone injection of the shoulder by me in March 2024.  Patient reports significant improvement of her shoulder pain from this injection.  She states she has been doing well until recently about 2 weeks ago.  She denies any precipitating injury she can recall and thinks it is just her occupation that involves repetitive motion.  Works in the  "deli where she does repetitive movement of her shoulder.  She describes a sharp/aching pain that is worse with range of motion movements.  She reports crepitus of her shoulder.  Denies any shoulder swelling or discoloration.     Patient also reports separately that she has been experiencing numbness of her bilateral hands.  She states typically at baseline she has tingling sensation along the tips of her fingers.  She states the numbness can radiate further through her arm.  She states with use of her arms this can occur and she especially notices it when she lies on her right shoulder which affects her right upper extremity or if she lies on her left shoulder affects her left upper extremity.  Denies undergoing an EMG of her upper extremities but states she has had this procedure for her lower extremities in the past.  Denies any discoloration/pallor of her hands with numbness occurs.  Denies any pattern of aggravation in cold weather.      Past Medical History[1]    Past Surgical History[2]    Medications Ordered Prior to Encounter[3]     Social History     Objective     Ht 5' 1\" (1.549 m)   Wt 103 kg (226 lb)   BMI 42.70 kg/m²     Constitutional:  see vital signs  Gen: well-developed, normocephalic/atraumatic, well-groomed  SKIN: no visible rashes or skin lesions  Pulmonary/Chest: Effort normal. No respiratory distress.      Griffin Muniz MD     Ortho Exam  Shoulder exam:       RIGHT    Inspection Ecchymosis None     Swelling None     Increased Warmth None     Other     Rotator cuff ER 5/5     IR 5/5     Abduction 5/5    ROM FF AROM/PROM: 150/170       Abduction AROM/PROM: 150/170     ER0 AROM/PROM: 60/60     IRb T10    TTP:  +AC joint; mildly over subacromial region laterally    Special Tests: O'Briens   Negative slap    Crank   Negative slap    Cross body Adduction Positive AC    Speeds  Negative biceps    Yergason's Negative biceps    Drop arm Negative     Neer Positive     Patton Positive  " "          Imaging Studies (I personally reviewed images in PACS and report):  X-ray right shoulder 3/15/2024: No acute osseous abnormalities. Severe/prominent osteoarthritis of the AC joint with superior and inferior spurring.     Medium joint arthrocentesis: R acromioclavicular    Performed by: Griffin Muniz MD  Authorized by: Griffin Muniz MD    Universal Protocol:  procedure performed by consultantConsent: Verbal consent obtained  Risks and benefits: risks, benefits and alternatives were discussed  Consent given by: patient  Patient understanding: patient states understanding of the procedure being performed  Radiology Images displayed and confirmed. If images not available, report reviewed: imaging studies available  Patient identity confirmed: verbally with patient  Supporting Documentation  Indications: pain and diagnostic evaluation   Procedure Details  Location: shoulder - R acromioclavicular  Preparation: Patient was prepped and draped in the usual sterile fashion  Needle size: 25 G  Ultrasound guidance: yes  Approach: dorsal  Medications administered: 0.5 mL bupivacaine 0.25 %; 20 mg triamcinolone acetonide 40 mg/mL    Patient tolerance: patient tolerated the procedure well with no immediate complications  Dressing:  Sterile dressing applied              -----------------------------------------------------------------  Portions of the record may have been created with voice recognition software. Occasional wrong word or \"sound a like\" substitutions may have occurred due to the inherent limitations of voice recognition software. Read the chart carefully and recognize, using context, where substitutions have occurred.          [1]   Past Medical History:  Diagnosis Date    Allergic     Arthritis     Atrial fibrillation (HCC)     CHF (congestive heart failure) (HCC)     COPD (chronic obstructive pulmonary disease) (HCC)     Coronary artery disease     Diverticulitis of colon     Headache(784.0)     " Hypertension     Shingles     Sleep apnea    [2]   Past Surgical History:  Procedure Laterality Date    AV NODE ABLATION      CARDIAC SURGERY  2023     SECTION  ,     KNEE SURGERY     [3]   Current Outpatient Medications on File Prior to Visit   Medication Sig Dispense Refill    albuterol (PROVENTIL HFA,VENTOLIN HFA) 90 mcg/act inhaler Inhale 2 puffs every 4 (four) hours as needed for wheezing 8.5 g 5    apixaban (Eliquis) 5 mg TAKE 1 TABLET (5 MG TOTAL) BY MOUTH TWO (2) (TWO) TIMES A DAY 60 tablet 5    aspirin 81 mg chewable tablet Chew 81 mg in the morning.      atorvastatin (LIPITOR) 40 mg tablet take 1 tablet by mouth every evening 90 tablet 1    busPIRone (BUSPAR) 7.5 mg tablet Take 1 tablet (7.5 mg total) by mouth 2 (two) times a day 60 tablet 5    furosemide (LASIX) 40 mg tablet take 1 tablet by mouth twice a day 180 tablet 1    losartan (COZAAR) 50 mg tablet take 1 tablet by mouth once daily 90 tablet 3    methylPREDNISolone 4 MG tablet therapy pack Use as directed on package 21 tablet 0    metoprolol succinate (TOPROL-XL) 25 mg 24 hr tablet Take 1 tablet (25 mg total) by mouth 2 (two) times a day 60 tablet 5    Multiple Vitamin (One-Daily Multi-Vitamin) TABS Take by mouth      potassium chloride (Klor-Con M20) 20 mEq tablet Take 2 tablets (40 mEq total) by mouth daily 60 tablet 5    Specialty Vitamins Products (Advanced Collagen) TABS Take by mouth       No current facility-administered medications on file prior to visit.

## 2025-06-17 NOTE — PATIENT INSTRUCTIONS
"Patient Education     Steroid injection   The Basics   Written by the doctors and editors at [facility]   What is a steroid injection? -- Steroids, also known as \"glucocorticoids,\" are medicines that help reduce swelling and pain. Doctors sometimes inject a steroid medicine into a joint or other part of the body to relieve pain. This is also sometimes called a \"cortisone shot.\"  After the injection, the steroid starts to work within a few days.  How long does a steroid injection work? -- It depends on the person and where the injection is given. For some people, the effects of a steroid injection can last for a few weeks or longer.  Sometimes, the doctor also injects a medicine called a \"local anesthetic\" with the steroid. This might help relieve pain until the steroid starts to work.  A steroid injection can help with pain, but it won't cure the problem that is causing the pain.  How do I prepare for a steroid injection? -- The doctor or nurse will tell you if you need to do anything special to prepare. Before your procedure, your doctor will do an exam.  Your doctor will also ask you about your \"health history.\" This involves asking you questions about any health problems you have or had in the past, past surgeries, and any medicines you take. Tell them about:   Any medicines you are taking - This includes any prescription or \"over-the-counter\" medicines you use, plus any herbal supplements you take. It helps to write down and bring a list of any medicines you take, or bring a bag with all of your medicines with you.   Any allergies you have   Any bleeding problems you have   Any other steroid injections you have had  Ask the doctor or nurse if you have questions or if there is anything you do not understand.  How is a steroid injection given? -- When it is time for the injection:   The doctor will clean the skin over the area where they plan to give the shot. (This is called the \"injection site.\")   They might use " ultrasound or a special kind of X-ray during the procedure. This is to check where to give the steroid.   Sometimes, they might give a shot of medicine to numb the skin.   They will inject the steroid.   Then, they will take out the needle and cover the injection site with a bandage.  What happens after a steroid injection? -- You can go home after the injection.  For the next few days, you might want to:   Apply a cold gel pack, bag of ice, or bag of frozen vegetables to the injection site every 1 to 2 hours, for 15 minutes each time. Put a thin towel between the ice (or other cold object) and your skin.   Rest the treated body part for a few days.   Take medicines to relieve pain. Examples include acetaminophen (sample brand name: Tylenol), ibuprofen (sample brand names: Advil, Motrin), or naproxen (sample brand name: Aleve).  If you have diabetes, the doctor might want you to check your blood sugar levels more often for a few days. The steroid medicine might temporarily raise your blood sugar.  What are the risks of a steroid injection? -- Your doctor will talk to you about all of the possible risks, and answer your questions. Possible risks include:   Bleeding, bruising, or soreness at the injection site   Damage to parts near the injection site - This might include cartilage damage, injury to nerves, tendon rupture, or thinning of skin and bones.   Skin around the injection site becoming lighter or whiter in color   Infection   Health conditions like diabetes or high blood pressure getting worse for a few days   Allergic reaction to the medicine  What else should I know? -- Most of the time, doctors limit the total number of steroid injections to a certain area.  A steroid injection might be only 1 part of your treatment plan. Take your other medicines as instructed. Also, follow your doctor's recommendations about other treatments. These might include things like physical therapy or devices like a brace or  cane.  All topics are updated as new evidence becomes available and our peer review process is complete.  This topic retrieved from [employer] on: Apr 25, 2024.  Topic 465680 Version 2.0  Release: 32.4.2 - C32.114  © 2024 [employer] and/or its affiliates. All rights reserved.  Consumer Information Use and Disclaimer   Disclaimer: This generalized information is a limited summary of diagnosis, treatment, and/or medication information. It is not meant to be comprehensive and should be used as a tool to help the user understand and/or assess potential diagnostic and treatment options. It does NOT include all information about conditions, treatments, medications, side effects, or risks that may apply to a specific patient. It is not intended to be medical advice or a substitute for the medical advice, diagnosis, or treatment of a health care provider based on the health care provider's examination and assessment of a patient's specific and unique circumstances. Patients must speak with a health care provider for complete information about their health, medical questions, and treatment options, including any risks or benefits regarding use of medications. This information does not endorse any treatments or medications as safe, effective, or approved for treating a specific patient. Up[employer]Date, Inc[employer] and its affiliates disclaim any warranty or liability relating to this information or the use thereof.The use of this information is governed by the Terms of Use, available at https://www.woltersThoughtLeadruwer.com/en/know/clinical-effectiveness-terms. 2024© UpToDate, Inc. and its affiliates and/or licensors. All rights reserved.  Copyright   © 2024 UpToDate, Inc. and/or its affiliates. All rights reserved.

## 2025-06-27 ENCOUNTER — HOSPITAL ENCOUNTER (OUTPATIENT)
Dept: RADIOLOGY | Facility: HOSPITAL | Age: 65
End: 2025-06-27
Payer: COMMERCIAL

## 2025-06-27 ENCOUNTER — APPOINTMENT (OUTPATIENT)
Dept: LAB | Facility: HOSPITAL | Age: 65
End: 2025-06-27
Attending: INTERNAL MEDICINE
Payer: COMMERCIAL

## 2025-06-27 ENCOUNTER — HOSPITAL ENCOUNTER (OUTPATIENT)
Dept: NON INVASIVE DIAGNOSTICS | Facility: HOSPITAL | Age: 65
Discharge: HOME | End: 2025-06-27
Attending: INTERNAL MEDICINE
Payer: COMMERCIAL

## 2025-06-27 VITALS
DIASTOLIC BLOOD PRESSURE: 62 MMHG | SYSTOLIC BLOOD PRESSURE: 120 MMHG | HEART RATE: 68 BPM | HEIGHT: 61 IN | BODY MASS INDEX: 42.67 KG/M2 | WEIGHT: 226 LBS

## 2025-06-27 DIAGNOSIS — R06.02 SOB (SHORTNESS OF BREATH): ICD-10-CM

## 2025-06-27 DIAGNOSIS — R06.02 SHORTNESS OF BREATH: ICD-10-CM

## 2025-06-27 LAB
ALBUMIN SERPL BCG-MCNC: 4.4 G/DL (ref 3.5–5)
ALP SERPL-CCNC: 68 U/L (ref 34–104)
ALT SERPL W P-5'-P-CCNC: 24 U/L (ref 7–52)
ANION GAP SERPL CALCULATED.3IONS-SCNC: 10 MMOL/L (ref 4–13)
AORTIC ROOT: 3.2 CM
ASCENDING AORTA: 3.2 CM
AST SERPL W P-5'-P-CCNC: 23 U/L (ref 13–39)
BILIRUB SERPL-MCNC: 0.93 MG/DL (ref 0.2–1)
BSA FOR ECHO PROCEDURE: 1.99 M2
BUN SERPL-MCNC: 13 MG/DL (ref 5–25)
CALCIUM SERPL-MCNC: 9.5 MG/DL (ref 8.4–10.2)
CHLORIDE SERPL-SCNC: 102 MMOL/L (ref 96–108)
CO2 SERPL-SCNC: 27 MMOL/L (ref 21–32)
CREAT SERPL-MCNC: 0.66 MG/DL (ref 0.6–1.3)
E WAVE DECELERATION TIME: 289 MS
E/A RATIO: 0.96
ERYTHROCYTE [DISTWIDTH] IN BLOOD BY AUTOMATED COUNT: 13.8 % (ref 11.6–15.1)
FRACTIONAL SHORTENING: 54 (ref 28–44)
GFR SERPL CREATININE-BSD FRML MDRD: 94 ML/MIN/1.73SQ M
GLUCOSE SERPL-MCNC: 98 MG/DL (ref 65–140)
HCT VFR BLD AUTO: 37.7 % (ref 34.8–46.1)
HGB BLD-MCNC: 12.4 G/DL (ref 11.5–15.4)
INTERVENTRICULAR SEPTUM IN DIASTOLE (PARASTERNAL SHORT AXIS VIEW): 1.2 CM
INTERVENTRICULAR SEPTUM: 1.2 CM (ref 0.6–1.1)
LAAS-AP2: 17.6 CM2
LAAS-AP4: 19 CM2
LEFT ATRIUM SIZE: 3.5 CM
LEFT ATRIUM VOLUME (MOD BIPLANE): 50 ML
LEFT ATRIUM VOLUME INDEX (MOD BIPLANE): 25.1 ML/M2
LEFT INTERNAL DIMENSION IN SYSTOLE: 2.1 CM (ref 2.1–4)
LEFT VENTRICLE DIASTOLIC VOLUME (MOD BIPLANE): 54 ML
LEFT VENTRICLE DIASTOLIC VOLUME INDEX (MOD BIPLANE): 27.1 ML/M2
LEFT VENTRICLE SYSTOLIC VOLUME (MOD BIPLANE): 18 ML
LEFT VENTRICLE SYSTOLIC VOLUME INDEX (MOD BIPLANE): 9 ML/M2
LEFT VENTRICULAR INTERNAL DIMENSION IN DIASTOLE: 4.6 CM (ref 3.5–6)
LEFT VENTRICULAR POSTERIOR WALL IN END DIASTOLE: 1.3 CM
LEFT VENTRICULAR STROKE VOLUME: 82 ML
LV EF BIPLANE MOD: 66 %
LV EF US.2D.A4C+ESTIMATED: 67 %
LVSV (TEICH): 82 ML
MCH RBC QN AUTO: 30.8 PG (ref 26.8–34.3)
MCHC RBC AUTO-ENTMCNC: 32.9 G/DL (ref 31.4–37.4)
MCV RBC AUTO: 94 FL (ref 82–98)
MV E'TISSUE VEL-LAT: 10 CM/S
MV E'TISSUE VEL-SEP: 7 CM/S
MV PEAK A VEL: 0.93 M/S
MV PEAK E VEL: 89 CM/S
MV STENOSIS PRESSURE HALF TIME: 84 MS
MV VALVE AREA P 1/2 METHOD: 2.62
PLATELET # BLD AUTO: 174 THOUSANDS/UL (ref 149–390)
PMV BLD AUTO: 12.9 FL (ref 8.9–12.7)
POTASSIUM SERPL-SCNC: 3.7 MMOL/L (ref 3.5–5.3)
PROT SERPL-MCNC: 7 G/DL (ref 6.4–8.4)
RBC # BLD AUTO: 4.02 MILLION/UL (ref 3.81–5.12)
RIGHT ATRIUM AREA SYSTOLE A4C: 10.4 CM2
RIGHT VENTRICLE ID DIMENSION: 4 CM
SL CV LEFT ATRIUM LENGTH A2C: 5.3 CM
SL CV LV EF: 60
SL CV PED ECHO LEFT VENTRICLE DIASTOLIC VOLUME (MOD BIPLANE) 2D: 96 ML
SL CV PED ECHO LEFT VENTRICLE SYSTOLIC VOLUME (MOD BIPLANE) 2D: 14 ML
SODIUM SERPL-SCNC: 139 MMOL/L (ref 135–147)
TR MAX PG: 22 MMHG
TR PEAK VELOCITY: 2.3 M/S
TRICUSPID ANNULAR PLANE SYSTOLIC EXCURSION: 3 CM
TRICUSPID VALVE PEAK REGURGITATION VELOCITY: 2.32 M/S
WBC # BLD AUTO: 7.77 THOUSAND/UL (ref 4.31–10.16)

## 2025-06-27 PROCEDURE — 93306 TTE W/DOPPLER COMPLETE: CPT

## 2025-06-27 PROCEDURE — 85027 COMPLETE CBC AUTOMATED: CPT

## 2025-06-27 PROCEDURE — 80053 COMPREHEN METABOLIC PANEL: CPT

## 2025-06-27 PROCEDURE — 36415 COLL VENOUS BLD VENIPUNCTURE: CPT

## 2025-06-27 PROCEDURE — 71046 X-RAY EXAM CHEST 2 VIEWS: CPT

## 2025-07-09 DIAGNOSIS — I10 ESSENTIAL HYPERTENSION: ICD-10-CM

## 2025-07-09 NOTE — TELEPHONE ENCOUNTER
Reason for call:   [x] Refill   [] Prior Auth  [] Other:     Office:   [x] PCP/Provider -   [] Specialty/Provider -     Medication: Losartan    Dose/Frequency: 50 mg    Quantity: 90 tablets    Pharmacy: [address] [phone]    Local Pharmacy   Does the patient have enough for 3 days?   [x] Yes   [] No - Send as HP to POD    Mail Away Pharmacy   Does the patient have enough for 10 days?   [] Yes   [] No - Send as HP to POD

## 2025-07-10 RX ORDER — LOSARTAN POTASSIUM 50 MG/1
50 TABLET ORAL DAILY
Qty: 90 TABLET | Refills: 0 | Status: SHIPPED | OUTPATIENT
Start: 2025-07-10

## 2025-07-31 DIAGNOSIS — E78.2 MIXED HYPERLIPIDEMIA: ICD-10-CM

## 2025-07-31 RX ORDER — ATORVASTATIN CALCIUM 40 MG/1
40 TABLET, FILM COATED ORAL EVERY EVENING
Qty: 90 TABLET | Refills: 1 | Status: SHIPPED | OUTPATIENT
Start: 2025-07-31

## 2025-08-15 ENCOUNTER — CONSULT (OUTPATIENT)
Dept: PAIN MEDICINE | Facility: CLINIC | Age: 65
End: 2025-08-15
Attending: PHYSICIAN ASSISTANT
Payer: COMMERCIAL

## 2025-08-15 PROBLEM — M47.22 OTHER SPONDYLOSIS WITH RADICULOPATHY, CERVICAL REGION: Status: ACTIVE | Noted: 2025-08-15

## 2025-08-20 ENCOUNTER — PREP FOR PROCEDURE (OUTPATIENT)
Dept: PAIN MEDICINE | Facility: CLINIC | Age: 65
End: 2025-08-20

## 2025-08-20 DIAGNOSIS — M54.12 CERVICAL RADICULOPATHY: Primary | ICD-10-CM
